# Patient Record
Sex: FEMALE | Race: WHITE | NOT HISPANIC OR LATINO | Employment: OTHER | URBAN - METROPOLITAN AREA
[De-identification: names, ages, dates, MRNs, and addresses within clinical notes are randomized per-mention and may not be internally consistent; named-entity substitution may affect disease eponyms.]

---

## 2023-01-23 PROBLEM — N18.32 CHRONIC KIDNEY DISEASE, STAGE 3B (MULTI): Status: ACTIVE | Noted: 2023-01-23

## 2023-01-23 PROBLEM — I63.9 STROKE (MULTI): Status: ACTIVE | Noted: 2023-01-23

## 2023-01-23 PROBLEM — H90.3 BILATERAL SENSORINEURAL HEARING LOSS: Status: ACTIVE | Noted: 2023-01-23

## 2023-01-23 PROBLEM — J30.9 ALLERGIC RHINITIS: Status: ACTIVE | Noted: 2023-01-23

## 2023-01-23 PROBLEM — H90.3 ASYMMETRICAL SENSORINEURAL HEARING LOSS: Status: ACTIVE | Noted: 2023-01-23

## 2023-01-23 PROBLEM — M54.50 LOW BACK PAIN: Status: ACTIVE | Noted: 2023-01-23

## 2023-01-23 PROBLEM — J44.9 COPD (CHRONIC OBSTRUCTIVE PULMONARY DISEASE) (MULTI): Status: ACTIVE | Noted: 2023-01-23

## 2023-01-23 PROBLEM — M81.0 OSTEOPOROSIS: Status: ACTIVE | Noted: 2023-01-23

## 2023-01-23 PROBLEM — E11.3299 DIABETES MELLITUS WITH BACKGROUND RETINOPATHY (MULTI): Status: ACTIVE | Noted: 2023-01-23

## 2023-01-23 PROBLEM — E87.5 HYPERKALEMIA: Status: ACTIVE | Noted: 2023-01-23

## 2023-01-23 PROBLEM — I35.9 AORTIC VALVE DISEASE: Status: ACTIVE | Noted: 2023-01-23

## 2023-01-23 PROBLEM — H54.7 VISUAL LOSS: Status: ACTIVE | Noted: 2023-01-23

## 2023-01-23 PROBLEM — E55.9 VITAMIN D DEFICIENCY: Status: ACTIVE | Noted: 2023-01-23

## 2023-01-23 PROBLEM — S49.92XA SHOULDER INJURY, LEFT, INITIAL ENCOUNTER: Status: ACTIVE | Noted: 2023-01-23

## 2023-01-23 PROBLEM — M25.539 WRIST PAIN: Status: ACTIVE | Noted: 2023-01-23

## 2023-01-23 PROBLEM — H91.90 HEARING LOSS: Status: ACTIVE | Noted: 2023-01-23

## 2023-01-23 PROBLEM — E86.0 DEHYDRATION: Status: ACTIVE | Noted: 2023-01-23

## 2023-01-23 PROBLEM — E05.90 HYPERTHYROIDISM: Status: ACTIVE | Noted: 2023-01-23

## 2023-01-23 PROBLEM — M25.511 RIGHT SHOULDER PAIN: Status: ACTIVE | Noted: 2023-01-23

## 2023-01-23 PROBLEM — R73.9 ACUTE HYPERGLYCEMIA: Status: ACTIVE | Noted: 2023-01-23

## 2023-01-23 PROBLEM — E11.319 DIABETIC RETINOPATHY (MULTI): Status: ACTIVE | Noted: 2023-01-23

## 2023-01-23 PROBLEM — K52.9 GASTROENTERITIS: Status: ACTIVE | Noted: 2023-01-23

## 2023-01-23 PROBLEM — H61.22 CERUMEN DEBRIS ON TYMPANIC MEMBRANE OF LEFT EAR: Status: ACTIVE | Noted: 2023-01-23

## 2023-01-23 PROBLEM — S39.92XA INJURY OF BACK: Status: ACTIVE | Noted: 2023-01-23

## 2023-01-23 PROBLEM — K21.9 GERD (GASTROESOPHAGEAL REFLUX DISEASE): Status: ACTIVE | Noted: 2023-01-23

## 2023-01-23 PROBLEM — M19.90 ARTHRITIS: Status: ACTIVE | Noted: 2023-01-23

## 2023-01-23 PROBLEM — I10 BENIGN ESSENTIAL HTN: Status: ACTIVE | Noted: 2023-01-23

## 2023-01-23 PROBLEM — K76.9 LIVER DISEASE, CHRONIC: Status: ACTIVE | Noted: 2023-01-23

## 2023-01-23 PROBLEM — K57.90 DIVERTICULOSIS: Status: ACTIVE | Noted: 2023-01-23

## 2023-01-23 RX ORDER — LISINOPRIL 10 MG/1
5 TABLET ORAL 2 TIMES DAILY
COMMUNITY
Start: 2021-11-19 | End: 2023-03-07 | Stop reason: ALTCHOICE

## 2023-01-23 RX ORDER — FLUTICASONE PROPIONATE 50 MCG
1 SPRAY, SUSPENSION (ML) NASAL
COMMUNITY
Start: 2021-05-05

## 2023-01-23 RX ORDER — INSULIN ASPART 100 [IU]/ML
INJECTION, SOLUTION INTRAVENOUS; SUBCUTANEOUS
COMMUNITY
Start: 2021-05-05

## 2023-01-23 RX ORDER — OMEPRAZOLE 20 MG/1
20 CAPSULE, DELAYED RELEASE ORAL
COMMUNITY
Start: 2021-05-05 | End: 2023-03-07 | Stop reason: SDUPTHER

## 2023-01-23 RX ORDER — INSULIN GLARGINE 100 [IU]/ML
22 INJECTION, SOLUTION SUBCUTANEOUS EVERY MORNING
COMMUNITY
Start: 2021-05-05 | End: 2023-03-07 | Stop reason: ALTCHOICE

## 2023-01-23 RX ORDER — CHOLECALCIFEROL (VITAMIN D3) 25 MCG
TABLET ORAL
COMMUNITY
Start: 2022-12-13

## 2023-01-23 RX ORDER — IBANDRONATE SODIUM 150 MG/1
150 TABLET, FILM COATED ORAL
COMMUNITY
Start: 2021-05-05 | End: 2023-03-07 | Stop reason: SDUPTHER

## 2023-01-23 RX ORDER — ATORVASTATIN CALCIUM 20 MG/1
20 TABLET, FILM COATED ORAL DAILY
COMMUNITY
Start: 2021-05-05 | End: 2023-03-07 | Stop reason: SDUPTHER

## 2023-02-28 RX ORDER — PEN NEEDLE, DIABETIC 30 GX3/16"
NEEDLE, DISPOSABLE MISCELLANEOUS DAILY
COMMUNITY

## 2023-03-03 LAB
ESTIMATED AVERAGE GLUCOSE FOR HBA1C: 189 MG/DL
HEMOGLOBIN A1C/HEMOGLOBIN TOTAL IN BLOOD: 8.2 %

## 2023-03-04 LAB
ALANINE AMINOTRANSFERASE (SGPT) (U/L) IN SER/PLAS: 14 U/L (ref 7–45)
ALBUMIN (G/DL) IN SER/PLAS: 4.3 G/DL (ref 3.4–5)
ALKALINE PHOSPHATASE (U/L) IN SER/PLAS: 64 U/L (ref 33–136)
ANION GAP IN SER/PLAS: 14 MMOL/L (ref 10–20)
ASPARTATE AMINOTRANSFERASE (SGOT) (U/L) IN SER/PLAS: 22 U/L (ref 9–39)
BILIRUBIN TOTAL (MG/DL) IN SER/PLAS: 0.5 MG/DL (ref 0–1.2)
CALCIUM (MG/DL) IN SER/PLAS: 9.6 MG/DL (ref 8.6–10.6)
CARBON DIOXIDE, TOTAL (MMOL/L) IN SER/PLAS: 26 MMOL/L (ref 21–32)
CHLORIDE (MMOL/L) IN SER/PLAS: 107 MMOL/L (ref 98–107)
CHOLESTEROL (MG/DL) IN SER/PLAS: 177 MG/DL (ref 0–199)
CHOLESTEROL IN HDL (MG/DL) IN SER/PLAS: 59.5 MG/DL
CHOLESTEROL/HDL RATIO: 3
CREATININE (MG/DL) IN SER/PLAS: 1.33 MG/DL (ref 0.5–1.05)
GFR FEMALE: 42 ML/MIN/1.73M2
GLUCOSE (MG/DL) IN SER/PLAS: 97 MG/DL (ref 74–99)
LDL: 102 MG/DL (ref 0–99)
POTASSIUM (MMOL/L) IN SER/PLAS: 4.6 MMOL/L (ref 3.5–5.3)
PROTEIN TOTAL: 7.4 G/DL (ref 6.4–8.2)
SODIUM (MMOL/L) IN SER/PLAS: 142 MMOL/L (ref 136–145)
THYROTROPIN (MIU/L) IN SER/PLAS BY DETECTION LIMIT <= 0.05 MIU/L: 2.42 MIU/L (ref 0.44–3.98)
THYROXINE (T4) FREE (NG/DL) IN SER/PLAS: 1.06 NG/DL (ref 0.78–1.48)
TRIGLYCERIDE (MG/DL) IN SER/PLAS: 76 MG/DL (ref 0–149)
TRIIODOTHYRONINE (T3) (NG/DL) IN SER/PLAS: 106 NG/DL (ref 60–200)
UREA NITROGEN (MG/DL) IN SER/PLAS: 27 MG/DL (ref 6–23)
VLDL: 15 MG/DL (ref 0–40)

## 2023-03-07 ENCOUNTER — OFFICE VISIT (OUTPATIENT)
Dept: PRIMARY CARE | Facility: CLINIC | Age: 73
End: 2023-03-07
Payer: MEDICARE

## 2023-03-07 VITALS
SYSTOLIC BLOOD PRESSURE: 100 MMHG | HEART RATE: 68 BPM | TEMPERATURE: 98.2 F | BODY MASS INDEX: 20.2 KG/M2 | DIASTOLIC BLOOD PRESSURE: 60 MMHG | WEIGHT: 107 LBS | RESPIRATION RATE: 16 BRPM | HEIGHT: 61 IN

## 2023-03-07 DIAGNOSIS — K21.00 GASTROESOPHAGEAL REFLUX DISEASE WITH ESOPHAGITIS WITHOUT HEMORRHAGE: ICD-10-CM

## 2023-03-07 DIAGNOSIS — I10 BENIGN ESSENTIAL HTN: ICD-10-CM

## 2023-03-07 DIAGNOSIS — I63.50 CEREBROVASCULAR ACCIDENT (CVA) DUE TO STENOSIS OF CEREBRAL ARTERY (MULTI): Primary | ICD-10-CM

## 2023-03-07 DIAGNOSIS — M81.0 AGE-RELATED OSTEOPOROSIS WITHOUT CURRENT PATHOLOGICAL FRACTURE: ICD-10-CM

## 2023-03-07 PROBLEM — M25.539 WRIST PAIN: Status: RESOLVED | Noted: 2023-01-23 | Resolved: 2023-03-07

## 2023-03-07 PROBLEM — H90.3 ASYMMETRICAL SENSORINEURAL HEARING LOSS: Status: RESOLVED | Noted: 2023-01-23 | Resolved: 2023-03-07

## 2023-03-07 PROBLEM — S39.92XA INJURY OF BACK: Status: RESOLVED | Noted: 2023-01-23 | Resolved: 2023-03-07

## 2023-03-07 PROBLEM — E86.0 DEHYDRATION: Status: RESOLVED | Noted: 2023-01-23 | Resolved: 2023-03-07

## 2023-03-07 PROBLEM — H61.22 CERUMEN DEBRIS ON TYMPANIC MEMBRANE OF LEFT EAR: Status: RESOLVED | Noted: 2023-01-23 | Resolved: 2023-03-07

## 2023-03-07 PROBLEM — M54.50 LOW BACK PAIN: Status: RESOLVED | Noted: 2023-01-23 | Resolved: 2023-03-07

## 2023-03-07 PROBLEM — R73.9 ACUTE HYPERGLYCEMIA: Status: RESOLVED | Noted: 2023-01-23 | Resolved: 2023-03-07

## 2023-03-07 PROBLEM — H91.90 HEARING LOSS: Status: RESOLVED | Noted: 2023-01-23 | Resolved: 2023-03-07

## 2023-03-07 PROBLEM — J30.9 ALLERGIC RHINITIS: Status: RESOLVED | Noted: 2023-01-23 | Resolved: 2023-03-07

## 2023-03-07 PROBLEM — K52.9 GASTROENTERITIS: Status: RESOLVED | Noted: 2023-01-23 | Resolved: 2023-03-07

## 2023-03-07 PROBLEM — M25.511 RIGHT SHOULDER PAIN: Status: RESOLVED | Noted: 2023-01-23 | Resolved: 2023-03-07

## 2023-03-07 PROBLEM — S49.92XA SHOULDER INJURY, LEFT, INITIAL ENCOUNTER: Status: RESOLVED | Noted: 2023-01-23 | Resolved: 2023-03-07

## 2023-03-07 PROBLEM — E87.5 HYPERKALEMIA: Status: RESOLVED | Noted: 2023-01-23 | Resolved: 2023-03-07

## 2023-03-07 PROCEDURE — 3052F HG A1C>EQUAL 8.0%<EQUAL 9.0%: CPT | Performed by: INTERNAL MEDICINE

## 2023-03-07 PROCEDURE — 1159F MED LIST DOCD IN RCRD: CPT | Performed by: INTERNAL MEDICINE

## 2023-03-07 PROCEDURE — 4010F ACE/ARB THERAPY RXD/TAKEN: CPT | Performed by: INTERNAL MEDICINE

## 2023-03-07 PROCEDURE — 3074F SYST BP LT 130 MM HG: CPT | Performed by: INTERNAL MEDICINE

## 2023-03-07 PROCEDURE — 99214 OFFICE O/P EST MOD 30 MIN: CPT | Performed by: INTERNAL MEDICINE

## 2023-03-07 PROCEDURE — 3078F DIAST BP <80 MM HG: CPT | Performed by: INTERNAL MEDICINE

## 2023-03-07 RX ORDER — INSULIN DEGLUDEC 100 U/ML
24 INJECTION, SOLUTION SUBCUTANEOUS EVERY MORNING
COMMUNITY
Start: 2023-02-24 | End: 2023-06-16 | Stop reason: DRUGHIGH

## 2023-03-07 RX ORDER — IBANDRONATE SODIUM 150 MG/1
150 TABLET, FILM COATED ORAL
Qty: 3 TABLET | Refills: 1 | Status: SHIPPED | OUTPATIENT
Start: 2023-03-07 | End: 2023-06-16 | Stop reason: SDUPTHER

## 2023-03-07 RX ORDER — OMEPRAZOLE 20 MG/1
20 CAPSULE, DELAYED RELEASE ORAL
Qty: 90 CAPSULE | Refills: 0 | Status: SHIPPED | OUTPATIENT
Start: 2023-03-07 | End: 2023-06-16 | Stop reason: SDUPTHER

## 2023-03-07 RX ORDER — ATORVASTATIN CALCIUM 20 MG/1
20 TABLET, FILM COATED ORAL DAILY
Qty: 90 TABLET | Refills: 1 | Status: SHIPPED | OUTPATIENT
Start: 2023-03-07 | End: 2023-03-28 | Stop reason: SDUPTHER

## 2023-03-07 RX ORDER — LISINOPRIL 5 MG/1
5 TABLET ORAL 2 TIMES DAILY
Qty: 180 TABLET | Refills: 0 | Status: SHIPPED | OUTPATIENT
Start: 2023-03-07 | End: 2023-06-16 | Stop reason: ALTCHOICE

## 2023-03-07 NOTE — PROGRESS NOTES
Recent cardio neuro and eye doc nephro due Subjective   Patient ID: Janay Hilliard is a 72 y.o. female who presents for Diabetes.  HPI  Patient is here for follow-up today.  She has history of stroke COPD hypertension diabetes chronic kidney disease osteoporosis.  She has been consistent with her medications.  She had a recent change of her long-acting insulin.  It was changed from Lantus to Tresiba.  Her dose is 24 units in the morning.  She had a low sugar this morning it was in the 40s.  She thought that it was from her Tresiba change.  She did take a full dose of her short acting insulin however with breakfast.  At that time she had an English muffin she did not have any protein with it.  Several hours later she had a low sugar.  She uses a freestyle sofya and it detects low sugars and gives her an alarm.  She was able to drink some sugar containing beverages and her sugar is better now.  It is about 160 in the office.  She was not having symptoms from the low sugar.  Her daughter was present who helps give some of the history and is a partial historian.  She does not think her mom eats right and does not get enough protein.  Janay is seeing her routine specialists including her neurologist her eye doctor and her cardiologist.  She also sees her endocrinologist they were the ones that adjusted her Tresiba.  She has a nephrology appointment coming up.  Her vision is poor chronically so it is unchanged.  No new neurological symptoms.  She denies chest pain shortness of breath headaches dizziness or lightheadedness.  She does not have any edema in her lower extremities.  Her GI system is unremarkable.    Review of Systems  Review of systems was performed and is otherwise negative except as noted in HPI.  Objective   Vitals:    03/07/23 1149   BP: 100/60   Pulse: 68   Resp: 16   Temp: 36.8 °C (98.2 °F)      Physical Exam  CONSTITUTIONAL - well nourished, well developed, looks like stated age, in no acute distress, not  ill-appearing, and not tired appearing  SKIN - normal skin color and pigmentation, normal skin turgor without rash, lesions, or nodules visualized  HEAD - no trauma, normocephalic  EYES - some strabismus  ENT - TM's intact, no injection, no exudate,  nasal passage without discharge and patent  NECK - supple without rigidity, no neck mass was observed, no thyromegaly or thyroid nodules  CHEST - clear to auscultation, no wheezing, no crackles and no rales, good effort  CARDIAC - regular rate and regular rhythm, no skipped beats, no murmur  ABDOMEN - no organomegaly, soft, nontender, nondistended, normal bowel sounds, no guarding/rebound/rigidity  EXTREMITIES - no edema, no deformities  NEUROLOGICAL -no gross neurological deficits some confusion  PSYCHIATRIC - alert, pleasant and cordial, age-appropriate  LYMPHATIC- no cervical lymphadenopathy    Assessment/Plan   Diagnoses and all orders for this visit:  Cerebrovascular accident (CVA) due to stenosis of cerebral artery (CMS/HCC)  -     apixaban (Eliquis) 5 mg tablet; Take 1 tablet (5 mg) by mouth in the morning and 1 tablet (5 mg) in the evening.  -     atorvastatin (Lipitor) 20 mg tablet; Take 1 tablet (20 mg) by mouth once daily.  Benign essential HTN  -     atorvastatin (Lipitor) 20 mg tablet; Take 1 tablet (20 mg) by mouth once daily.  -     lisinopril 5 mg tablet; Take 1 tablet (5 mg) by mouth in the morning and 1 tablet (5 mg) before bedtime.  Gastroesophageal reflux disease with esophagitis without hemorrhage  -     omeprazole (PriLOSEC) 20 mg DR capsule; Take 1 capsule (20 mg) by mouth once daily in the morning. Take before meals.  Age-related osteoporosis without current pathological fracture  -     ibandronate (Boniva) 150 mg tablet; Take 1 tablet (150 mg) by mouth every 30 (thirty) days.  Follow-up with me in 3 months  Get blood work before the appointment  Refills done as noted  Blood work reviewed in office  Patient had hemoglobin A1c lipid chemistry and  full thyroid panel  Discussed with patient the low sugars and the relationship likely to the short acting insulin and the need to include protein in her diet she is to notify her endocrinologist for low sugars  Taylor Nieto MD

## 2023-03-27 ENCOUNTER — TELEPHONE (OUTPATIENT)
Dept: PRIMARY CARE | Facility: CLINIC | Age: 73
End: 2023-03-27
Payer: COMMERCIAL

## 2023-03-27 DIAGNOSIS — I63.50 CEREBROVASCULAR ACCIDENT (CVA) DUE TO STENOSIS OF CEREBRAL ARTERY (MULTI): ICD-10-CM

## 2023-03-27 DIAGNOSIS — I10 BENIGN ESSENTIAL HTN: ICD-10-CM

## 2023-03-27 NOTE — TELEPHONE ENCOUNTER
Patient LDM on machine with a question about whether or not she should be taking eliquis and if so how should she be taking it.    CB#  [phone].    Or can speak with her daughter.   Lov 1.19.23  Uov 6.16.23

## 2023-03-27 NOTE — TELEPHONE ENCOUNTER
Patient LDM on machine for refill  Atorvastatin 20 mg , has a few days left  Lov 1.19.23  Uov  6.16.23  DM Marry Jones

## 2023-03-28 RX ORDER — ATORVASTATIN CALCIUM 20 MG/1
20 TABLET, FILM COATED ORAL DAILY
Qty: 90 TABLET | Refills: 0 | Status: SHIPPED | OUTPATIENT
Start: 2023-03-28 | End: 2023-06-16 | Stop reason: SDUPTHER

## 2023-03-28 NOTE — TELEPHONE ENCOUNTER
Patient remains tobacco free at this time. The patient remains on the prescribed tobacco cessation medication regimen of 1 mg Chantix BID without any negative side effects at this time. .The patient denies any abnormal behavioral or mental changes at this time. We discussed and reviewed: self confidence, triggers, strategies, habitual behavior, high risks situations (Holiday Season).  Introduced lapses, relapses, understanding them and analyzing the situation of a lapse, conflict issues that may be linked to a lapse. She is feeling confident that she will sustain her tobacco free status, she has support from family and friends now. She will follow up in 2 weeks.    Pt's daughter made aware pt needs to continue eliquis

## 2023-05-11 LAB
ALBUMIN (G/DL) IN SER/PLAS: 3.9 G/DL (ref 3.4–5)
ALBUMIN (MG/L) IN URINE: 209.1 MG/L
ALBUMIN/CREATININE (UG/MG) IN URINE: 153.8 UG/MG CRT (ref 0–30)
ANION GAP IN SER/PLAS: 13 MMOL/L (ref 10–20)
CALCIDIOL (25 OH VITAMIN D3) (NG/ML) IN SER/PLAS: 36 NG/ML
CALCIUM (MG/DL) IN SER/PLAS: 8.8 MG/DL (ref 8.6–10.6)
CARBON DIOXIDE, TOTAL (MMOL/L) IN SER/PLAS: 25 MMOL/L (ref 21–32)
CHLORIDE (MMOL/L) IN SER/PLAS: 103 MMOL/L (ref 98–107)
CREATININE (MG/DL) IN SER/PLAS: 1.68 MG/DL (ref 0.5–1.05)
CREATININE (MG/DL) IN URINE: 136 MG/DL (ref 20–320)
CREATININE (MG/DL) IN URINE: 136 MG/DL (ref 20–320)
GFR FEMALE: 32 ML/MIN/1.73M2
GLUCOSE (MG/DL) IN SER/PLAS: 275 MG/DL (ref 74–99)
PARATHYRIN INTACT (PG/ML) IN SER/PLAS: 162.4 PG/ML (ref 18.5–88)
PHOSPHATE (MG/DL) IN SER/PLAS: 5.3 MG/DL (ref 2.5–4.9)
POTASSIUM (MMOL/L) IN SER/PLAS: 4.5 MMOL/L (ref 3.5–5.3)
PROTEIN (MG/DL) IN URINE: 61 MG/DL (ref 5–24)
PROTEIN/CREATININE (MG/MG) IN URINE: 0.45 MG/MG CREAT (ref 0–0.17)
SODIUM (MMOL/L) IN SER/PLAS: 136 MMOL/L (ref 136–145)
UREA NITROGEN (MG/DL) IN SER/PLAS: 32 MG/DL (ref 6–23)

## 2023-06-16 ENCOUNTER — OFFICE VISIT (OUTPATIENT)
Dept: PRIMARY CARE | Facility: CLINIC | Age: 73
End: 2023-06-16
Payer: MEDICARE

## 2023-06-16 VITALS
DIASTOLIC BLOOD PRESSURE: 62 MMHG | WEIGHT: 107 LBS | TEMPERATURE: 97.9 F | RESPIRATION RATE: 16 BRPM | SYSTOLIC BLOOD PRESSURE: 100 MMHG | HEIGHT: 61 IN | HEART RATE: 76 BPM | BODY MASS INDEX: 20.2 KG/M2

## 2023-06-16 DIAGNOSIS — I10 BENIGN ESSENTIAL HTN: ICD-10-CM

## 2023-06-16 DIAGNOSIS — Z79.4 TYPE 2 DIABETES MELLITUS WITH STABLE PROLIFERATIVE RETINOPATHY OF BOTH EYES, WITH LONG-TERM CURRENT USE OF INSULIN (MULTI): ICD-10-CM

## 2023-06-16 DIAGNOSIS — E46 PROTEIN-CALORIE MALNUTRITION, UNSPECIFIED SEVERITY (MULTI): Primary | ICD-10-CM

## 2023-06-16 DIAGNOSIS — N25.81 SECONDARY HYPERPARATHYROIDISM OF RENAL ORIGIN (MULTI): ICD-10-CM

## 2023-06-16 DIAGNOSIS — I63.50 CEREBROVASCULAR ACCIDENT (CVA) DUE TO STENOSIS OF CEREBRAL ARTERY (MULTI): ICD-10-CM

## 2023-06-16 DIAGNOSIS — E11.3553 TYPE 2 DIABETES MELLITUS WITH STABLE PROLIFERATIVE RETINOPATHY OF BOTH EYES, WITH LONG-TERM CURRENT USE OF INSULIN (MULTI): ICD-10-CM

## 2023-06-16 DIAGNOSIS — M81.0 AGE-RELATED OSTEOPOROSIS WITHOUT CURRENT PATHOLOGICAL FRACTURE: ICD-10-CM

## 2023-06-16 DIAGNOSIS — K21.00 GASTROESOPHAGEAL REFLUX DISEASE WITH ESOPHAGITIS WITHOUT HEMORRHAGE: ICD-10-CM

## 2023-06-16 DIAGNOSIS — I73.9 PERIPHERAL VASCULAR DISEASE, UNSPECIFIED (CMS-HCC): ICD-10-CM

## 2023-06-16 PROCEDURE — 1160F RVW MEDS BY RX/DR IN RCRD: CPT | Performed by: INTERNAL MEDICINE

## 2023-06-16 PROCEDURE — 3078F DIAST BP <80 MM HG: CPT | Performed by: INTERNAL MEDICINE

## 2023-06-16 PROCEDURE — 3074F SYST BP LT 130 MM HG: CPT | Performed by: INTERNAL MEDICINE

## 2023-06-16 PROCEDURE — 4010F ACE/ARB THERAPY RXD/TAKEN: CPT | Performed by: INTERNAL MEDICINE

## 2023-06-16 PROCEDURE — 99214 OFFICE O/P EST MOD 30 MIN: CPT | Performed by: INTERNAL MEDICINE

## 2023-06-16 PROCEDURE — 1036F TOBACCO NON-USER: CPT | Performed by: INTERNAL MEDICINE

## 2023-06-16 PROCEDURE — 3052F HG A1C>EQUAL 8.0%<EQUAL 9.0%: CPT | Performed by: INTERNAL MEDICINE

## 2023-06-16 PROCEDURE — 1159F MED LIST DOCD IN RCRD: CPT | Performed by: INTERNAL MEDICINE

## 2023-06-16 RX ORDER — ATORVASTATIN CALCIUM 20 MG/1
20 TABLET, FILM COATED ORAL DAILY
Qty: 90 TABLET | Refills: 1 | Status: SHIPPED | OUTPATIENT
Start: 2023-06-16 | End: 2024-02-14 | Stop reason: SDUPTHER

## 2023-06-16 RX ORDER — LISINOPRIL 5 MG/1
5 TABLET ORAL 2 TIMES DAILY
Qty: 180 TABLET | Refills: 1 | Status: SHIPPED | OUTPATIENT
Start: 2023-06-16 | End: 2024-04-12 | Stop reason: SDUPTHER

## 2023-06-16 RX ORDER — INSULIN DEGLUDEC 100 U/ML
18 INJECTION, SOLUTION SUBCUTANEOUS EVERY MORNING
Qty: 3 ML | Refills: 0 | Status: SHIPPED | OUTPATIENT
Start: 2023-06-16

## 2023-06-16 RX ORDER — OMEPRAZOLE 20 MG/1
20 CAPSULE, DELAYED RELEASE ORAL
Qty: 90 CAPSULE | Refills: 0 | Status: SHIPPED | OUTPATIENT
Start: 2023-06-16 | End: 2024-02-14 | Stop reason: SDUPTHER

## 2023-06-16 RX ORDER — LISINOPRIL 5 MG/1
TABLET ORAL EVERY 12 HOURS
COMMUNITY
Start: 2021-10-21 | End: 2023-06-16 | Stop reason: SDUPTHER

## 2023-06-16 RX ORDER — LISINOPRIL 10 MG/1
0.5 TABLET ORAL 2 TIMES DAILY
COMMUNITY
Start: 2021-11-19 | End: 2023-06-16 | Stop reason: ALTCHOICE

## 2023-06-16 RX ORDER — IBANDRONATE SODIUM 150 MG/1
150 TABLET, FILM COATED ORAL
Qty: 3 TABLET | Refills: 1 | Status: SHIPPED | OUTPATIENT
Start: 2023-06-16 | End: 2023-09-22 | Stop reason: ALTCHOICE

## 2023-06-16 RX ORDER — LISINOPRIL 5 MG/1
5 TABLET ORAL 2 TIMES DAILY
Qty: 180 TABLET | Refills: 0 | Status: CANCELLED | OUTPATIENT
Start: 2023-06-16 | End: 2023-09-14

## 2023-06-16 ASSESSMENT — COLUMBIA-SUICIDE SEVERITY RATING SCALE - C-SSRS
6. HAVE YOU EVER DONE ANYTHING, STARTED TO DO ANYTHING, OR PREPARED TO DO ANYTHING TO END YOUR LIFE?: NO
1. IN THE PAST MONTH, HAVE YOU WISHED YOU WERE DEAD OR WISHED YOU COULD GO TO SLEEP AND NOT WAKE UP?: NO
2. HAVE YOU ACTUALLY HAD ANY THOUGHTS OF KILLING YOURSELF?: NO

## 2023-06-16 ASSESSMENT — PATIENT HEALTH QUESTIONNAIRE - PHQ9
1. LITTLE INTEREST OR PLEASURE IN DOING THINGS: NOT AT ALL
2. FEELING DOWN, DEPRESSED OR HOPELESS: NOT AT ALL
SUM OF ALL RESPONSES TO PHQ9 QUESTIONS 1 AND 2: 0

## 2023-06-16 ASSESSMENT — ENCOUNTER SYMPTOMS: DEPRESSION: 0

## 2023-06-16 NOTE — PROGRESS NOTES
"Subjective   Patient ID: Janay Hilliard is a 73 y.o. female who presents for Med Refill, Hypertension, Hyperlipidemia, and Diabetes.  HPI  Patient presents today with her daughter who is her historian as well she is following up on her diabetes hyperlipidemia hypertension renal insufficiency acid reflux and thin bones.    She is due for bone density they found her last one  it was due to done in 2021.  So she is due now.  She has osteoporosis and she is on Boniva after she thought she had been on it since 2020 but it turns out it was maybe 2018.  So we talked that she will need to see a rheumatologist if she can be off this medication for other options patient denies a history of fractures    Patient continues to follow-up with her cardiologist Dr. Pretty    She sees her Endo at the [facility]  She sees nephrology as well her next appoint with them is not for a few months      Review of Systems  Review of systems was performed and is otherwise negative except as noted in HPI.  Objective   /62   Pulse 76   Temp 36.6 °C (97.9 °F)   Resp 16   Ht 1.549 m (5' 1\")   Wt 48.5 kg (107 lb)   BMI 20.22 kg/m²    Physical Exam  HEENT is normal  Lungs clear bilaterally  Heart reveals a 3 out of 6 systolic ejection murmur that is heard diffusely  Abdomen is benign  Lower extremities no edema  Assessment/Plan   Diagnoses and all orders for this visit:  Protein-calorie malnutrition, unspecified severity (CMS/HCC)  Cerebrovascular accident (CVA) due to stenosis of cerebral artery (CMS/HCC)  -     apixaban (Eliquis) 5 mg tablet; Take 1 tablet (5 mg) by mouth every 12 hours.  -     atorvastatin (Lipitor) 20 mg tablet; Take 1 tablet (20 mg) by mouth once daily.  Benign essential HTN  -     atorvastatin (Lipitor) 20 mg tablet; Take 1 tablet (20 mg) by mouth once daily.  -     lisinopril 5 mg tablet; Take 1 tablet (5 mg) by mouth 2 times a day.  -     Hemoglobin A1C; Future  -     Lipid Panel; Future  -     Comprehensive " Metabolic Panel; Future  Gastroesophageal reflux disease with esophagitis without hemorrhage  -     omeprazole (PriLOSEC) 20 mg DR capsule; Take 1 capsule (20 mg) by mouth once daily in the morning. Take before meals.  Age-related osteoporosis without current pathological fracture  -     ibandronate (Boniva) 150 mg tablet; Take 1 tablet (150 mg) by mouth every 30 (thirty) days.  -     XR DEXA bone density; Future  Peripheral vascular disease, unspecified (CMS/HCC)  Type 2 diabetes mellitus with stable proliferative retinopathy of both eyes, with long-term current use of insulin (CMS/[facility])  -     Hemoglobin A1C; Future  -     Lipid Panel; Future  -     Comprehensive Metabolic Panel; Future  Secondary hyperparathyroidism of renal origin (CMS/[facility])  -     Hemoglobin A1C; Future  -     Lipid Panel; Future  -     Comprehensive Metabolic Panel; Future  Other orders  -     Tresiba FlexTouch U-100 100 unit/mL (3 mL) injection; Inject 18 Units under the skin once daily in the morning.    Updated her orders in her chart to reflect current dosing  Ordered her DEXA   Blood work ordered for next visit  She is to keep up with her eye doctor appointments  We will check the DEXA and if its not amenable to discontinuing the bisphosphonate will refer to rheumatology  Continue routine follow-up with cardiology  Patient has blood work orders for upcoming appointment from an endocrinology    Taylor Nieto MD    Eucrisa Counseling: Patient may experience a mild burning sensation during topical application. Eucrisa is not approved in children less than 2 years of age.

## 2023-06-30 LAB
ALANINE AMINOTRANSFERASE (SGPT) (U/L) IN SER/PLAS: 11 U/L (ref 7–45)
ALBUMIN (G/DL) IN SER/PLAS: 4.1 G/DL (ref 3.4–5)
ALBUMIN (MG/L) IN URINE: 144 MG/L
ALBUMIN/CREATININE (UG/MG) IN URINE: 149.4 UG/MG CRT (ref 0–30)
ALKALINE PHOSPHATASE (U/L) IN SER/PLAS: 66 U/L (ref 33–136)
ANION GAP IN SER/PLAS: 12 MMOL/L (ref 10–20)
ASPARTATE AMINOTRANSFERASE (SGOT) (U/L) IN SER/PLAS: 22 U/L (ref 9–39)
BILIRUBIN TOTAL (MG/DL) IN SER/PLAS: 0.6 MG/DL (ref 0–1.2)
CALCIUM (MG/DL) IN SER/PLAS: 9.5 MG/DL (ref 8.6–10.6)
CARBON DIOXIDE, TOTAL (MMOL/L) IN SER/PLAS: 23 MMOL/L (ref 21–32)
CHLORIDE (MMOL/L) IN SER/PLAS: 110 MMOL/L (ref 98–107)
CHOLESTEROL (MG/DL) IN SER/PLAS: 165 MG/DL (ref 0–199)
CHOLESTEROL IN HDL (MG/DL) IN SER/PLAS: 48.5 MG/DL
CHOLESTEROL/HDL RATIO: 3.4
CREATININE (MG/DL) IN SER/PLAS: 1.49 MG/DL (ref 0.5–1.05)
CREATININE (MG/DL) IN URINE: 96.4 MG/DL (ref 20–320)
ESTIMATED AVERAGE GLUCOSE FOR HBA1C: 183 MG/DL
GFR FEMALE: 37 ML/MIN/1.73M2
GLUCOSE (MG/DL) IN SER/PLAS: 155 MG/DL (ref 74–99)
HEMOGLOBIN A1C/HEMOGLOBIN TOTAL IN BLOOD: 8 %
NON-HDL CHOLESTEROL: 117 MG/DL
POTASSIUM (MMOL/L) IN SER/PLAS: 4.3 MMOL/L (ref 3.5–5.3)
PROTEIN TOTAL: 7.3 G/DL (ref 6.4–8.2)
SODIUM (MMOL/L) IN SER/PLAS: 141 MMOL/L (ref 136–145)
UREA NITROGEN (MG/DL) IN SER/PLAS: 30 MG/DL (ref 6–23)

## 2023-07-03 DIAGNOSIS — M81.0 AGE RELATED OSTEOPOROSIS, UNSPECIFIED PATHOLOGICAL FRACTURE PRESENCE: Primary | ICD-10-CM

## 2023-09-21 ENCOUNTER — LAB (OUTPATIENT)
Dept: LAB | Facility: LAB | Age: 73
End: 2023-09-21
Payer: MEDICARE

## 2023-09-21 DIAGNOSIS — E11.3553 TYPE 2 DIABETES MELLITUS WITH STABLE PROLIFERATIVE RETINOPATHY OF BOTH EYES, WITH LONG-TERM CURRENT USE OF INSULIN (MULTI): ICD-10-CM

## 2023-09-21 DIAGNOSIS — I10 BENIGN ESSENTIAL HTN: ICD-10-CM

## 2023-09-21 DIAGNOSIS — N25.81 SECONDARY HYPERPARATHYROIDISM OF RENAL ORIGIN (MULTI): ICD-10-CM

## 2023-09-21 DIAGNOSIS — Z79.4 TYPE 2 DIABETES MELLITUS WITH STABLE PROLIFERATIVE RETINOPATHY OF BOTH EYES, WITH LONG-TERM CURRENT USE OF INSULIN (MULTI): ICD-10-CM

## 2023-09-21 LAB
ALANINE AMINOTRANSFERASE (SGPT) (U/L) IN SER/PLAS: 14 U/L (ref 7–45)
ALBUMIN (G/DL) IN SER/PLAS: 4.2 G/DL (ref 3.4–5)
ALKALINE PHOSPHATASE (U/L) IN SER/PLAS: 60 U/L (ref 33–136)
ANION GAP IN SER/PLAS: 14 MMOL/L (ref 10–20)
ASPARTATE AMINOTRANSFERASE (SGOT) (U/L) IN SER/PLAS: 24 U/L (ref 9–39)
BILIRUBIN TOTAL (MG/DL) IN SER/PLAS: 0.5 MG/DL (ref 0–1.2)
CALCIUM (MG/DL) IN SER/PLAS: 9.7 MG/DL (ref 8.6–10.6)
CARBON DIOXIDE, TOTAL (MMOL/L) IN SER/PLAS: 26 MMOL/L (ref 21–32)
CHLORIDE (MMOL/L) IN SER/PLAS: 107 MMOL/L (ref 98–107)
CHOLESTEROL (MG/DL) IN SER/PLAS: 152 MG/DL (ref 0–199)
CHOLESTEROL IN HDL (MG/DL) IN SER/PLAS: 50.6 MG/DL
CHOLESTEROL/HDL RATIO: 3
CREATININE (MG/DL) IN SER/PLAS: 1.45 MG/DL (ref 0.5–1.05)
ESTIMATED AVERAGE GLUCOSE FOR HBA1C: 177 MG/DL
GFR FEMALE: 38 ML/MIN/1.73M2
GLUCOSE (MG/DL) IN SER/PLAS: 115 MG/DL (ref 74–99)
HEMOGLOBIN A1C/HEMOGLOBIN TOTAL IN BLOOD: 7.8 %
LDL: 81 MG/DL (ref 0–99)
POTASSIUM (MMOL/L) IN SER/PLAS: 4.6 MMOL/L (ref 3.5–5.3)
PROTEIN TOTAL: 7.3 G/DL (ref 6.4–8.2)
SODIUM (MMOL/L) IN SER/PLAS: 142 MMOL/L (ref 136–145)
TRIGLYCERIDE (MG/DL) IN SER/PLAS: 100 MG/DL (ref 0–149)
UREA NITROGEN (MG/DL) IN SER/PLAS: 27 MG/DL (ref 6–23)
VLDL: 20 MG/DL (ref 0–40)

## 2023-09-21 PROCEDURE — 80053 COMPREHEN METABOLIC PANEL: CPT

## 2023-09-21 PROCEDURE — 80061 LIPID PANEL: CPT

## 2023-09-21 PROCEDURE — 36415 COLL VENOUS BLD VENIPUNCTURE: CPT

## 2023-09-21 PROCEDURE — 83036 HEMOGLOBIN GLYCOSYLATED A1C: CPT

## 2023-09-22 ENCOUNTER — OFFICE VISIT (OUTPATIENT)
Dept: PRIMARY CARE | Facility: CLINIC | Age: 73
End: 2023-09-22
Payer: MEDICARE

## 2023-09-22 VITALS
BODY MASS INDEX: 20.39 KG/M2 | HEART RATE: 79 BPM | DIASTOLIC BLOOD PRESSURE: 74 MMHG | WEIGHT: 108 LBS | SYSTOLIC BLOOD PRESSURE: 128 MMHG | HEIGHT: 61 IN | OXYGEN SATURATION: 98 %

## 2023-09-22 DIAGNOSIS — M81.0 AGE-RELATED OSTEOPOROSIS WITHOUT CURRENT PATHOLOGICAL FRACTURE: ICD-10-CM

## 2023-09-22 DIAGNOSIS — I10 BENIGN ESSENTIAL HTN: Primary | ICD-10-CM

## 2023-09-22 DIAGNOSIS — Z79.4 TYPE 2 DIABETES MELLITUS WITH STABLE PROLIFERATIVE RETINOPATHY OF BOTH EYES, WITH LONG-TERM CURRENT USE OF INSULIN (MULTI): ICD-10-CM

## 2023-09-22 DIAGNOSIS — E11.3553 TYPE 2 DIABETES MELLITUS WITH STABLE PROLIFERATIVE RETINOPATHY OF BOTH EYES, WITH LONG-TERM CURRENT USE OF INSULIN (MULTI): ICD-10-CM

## 2023-09-22 DIAGNOSIS — N18.32 CHRONIC KIDNEY DISEASE, STAGE 3B (MULTI): ICD-10-CM

## 2023-09-22 PROCEDURE — 1126F AMNT PAIN NOTED NONE PRSNT: CPT | Performed by: INTERNAL MEDICINE

## 2023-09-22 PROCEDURE — 3078F DIAST BP <80 MM HG: CPT | Performed by: INTERNAL MEDICINE

## 2023-09-22 PROCEDURE — 90662 IIV NO PRSV INCREASED AG IM: CPT | Performed by: INTERNAL MEDICINE

## 2023-09-22 PROCEDURE — 3051F HG A1C>EQUAL 7.0%<8.0%: CPT | Performed by: INTERNAL MEDICINE

## 2023-09-22 PROCEDURE — 99214 OFFICE O/P EST MOD 30 MIN: CPT | Performed by: INTERNAL MEDICINE

## 2023-09-22 PROCEDURE — 1160F RVW MEDS BY RX/DR IN RCRD: CPT | Performed by: INTERNAL MEDICINE

## 2023-09-22 PROCEDURE — 3074F SYST BP LT 130 MM HG: CPT | Performed by: INTERNAL MEDICINE

## 2023-09-22 PROCEDURE — 4010F ACE/ARB THERAPY RXD/TAKEN: CPT | Performed by: INTERNAL MEDICINE

## 2023-09-22 PROCEDURE — 1036F TOBACCO NON-USER: CPT | Performed by: INTERNAL MEDICINE

## 2023-09-22 PROCEDURE — G0008 ADMIN INFLUENZA VIRUS VAC: HCPCS | Performed by: INTERNAL MEDICINE

## 2023-09-22 PROCEDURE — 1159F MED LIST DOCD IN RCRD: CPT | Performed by: INTERNAL MEDICINE

## 2023-09-22 RX ORDER — IBANDRONATE SODIUM 150 MG/1
150 TABLET, FILM COATED ORAL
Qty: 3 TABLET | Refills: 1 | Status: CANCELLED | OUTPATIENT
Start: 2023-09-22

## 2023-09-22 ASSESSMENT — PAIN SCALES - GENERAL: PAINLEVEL: 0-NO PAIN

## 2023-09-22 NOTE — PROGRESS NOTES
"Subjective   Patient ID: Janay Hilliard is a 73 y.o. female who presents for Follow-up.  HPI  Patient presents today for follow-up of hypertension hyperlipidemia clotting disorder diabetes.    She sees endocrinology and has an upcoming appointment.  She also sees renal for chronic renal insufficiency.  She has a history of vision abnormalities and vision loss secondary to her diabetes.    Patient has not seen a cardiologist due to her heart murmur.  She presents today with her daughter who is her historian.  She has been feeling quite well however she had a recent DEXA and it was not showing good results.  Working to go ahead and stop her Boniva because she is at 5+ years and she has appoint with rheumatology upcoming to discuss options for that.  She has had some falls and has not had any broken bones that is encouraging.    Patient denies chest pains headaches dizziness lightheadedness or shortness of breath she does not have any lower extremity edema she has not been ill recently    Review of Systems  Review of systems was performed and is otherwise negative except as noted in HPI.  Objective   /74   Pulse 79   Ht 1.549 m (5' 1\")   Wt 49 kg (108 lb)   SpO2 98%   BMI 20.41 kg/m²    Physical Exam  HEENT is normal she has some abnormal alignment of her eyes drooping of her eyelids  Lungs clear bilaterally  Heart is regular rate and rhythm no murmurs  Abdomen is benign  She has an abnormal slightly unsteady gait    Assessment/Plan   Diagnoses and all orders for this visit:  Benign essential HTN  -     Follow Up In [facility] - PCP - Established; Future  Age-related osteoporosis without current pathological fracture  Type 2 diabetes mellitus with stable proliferative retinopathy of both eyes, with long-term current use of insulin (CMS/[facility])  -     Follow Up In [facility] - PCP - Established; Future  Chronic kidney disease, stage 3b (CMS/[facility])  -     Follow Up In [facility] - PCP " - Established; Future  Other orders  -     Flu vaccine, quadrivalent, high-dose, preservative free, age 65y+ (FLUZONE)    Call with issues  Follow-up with me in 3 months  Blood work is ordered  She will follow-up with me as scheduled as well as Endo cardiology and nephrology nephrology appointment as soon  Taylor Nieto MD

## 2023-10-06 ENCOUNTER — CLINICAL SUPPORT (OUTPATIENT)
Dept: AUDIOLOGY | Facility: CLINIC | Age: 73
End: 2023-10-06

## 2023-10-06 ENCOUNTER — TELEPHONE (OUTPATIENT)
Dept: PRIMARY CARE | Facility: CLINIC | Age: 73
End: 2023-10-06

## 2023-10-06 DIAGNOSIS — H90.3 SENSORINEURAL HEARING LOSS (SNHL) OF BOTH EARS: Primary | ICD-10-CM

## 2023-10-06 DIAGNOSIS — I73.9 PVD (PERIPHERAL VASCULAR DISEASE) (CMS-HCC): Primary | ICD-10-CM

## 2023-10-06 NOTE — TELEPHONE ENCOUNTER
Pt daughter called requesting test results on a test that she had done 2 weeks ago. She stated they have not heard. Did not specify what test was ordered. Please advise?

## 2023-10-06 NOTE — PROGRESS NOTES
[facility]  HEARING AID CHECK      Name:  Janay Hilliard  YOB: 1950  Today's date:  10/06/23      PATIENT ISSUES/CONCERNS AND OTOSCOPIC RESULTS   Otoscopic was clear.   Patient reports that neither hearing aid appears to be working.    HEARING AID INSPECTION AND ADJUSTMENT  Inspection reveals a plugged right filter and a missing left filter with plugged  tubing.  Cleaned both aids, suctioned receivers, replaced filters and cleaned vents.  Resulting listening check was good.  Analyzer data good.    Patient reports good subjective audibility following cleaning.  No need for adjustment today.    FOLLOW UP   The patient was asked to contact the office if they notice any significant change in hearing level or hearing aid function.    Otherwise, will follow up again in January as previously scheduled.    APPOINTMENT TIME  1:30-2:00pm    Reina Perera  Doctor of Audiology  Licensed Audiologist

## 2023-11-15 DIAGNOSIS — Z12.31 VISIT FOR SCREENING MAMMOGRAM: ICD-10-CM

## 2023-12-01 ENCOUNTER — APPOINTMENT (OUTPATIENT)
Dept: RHEUMATOLOGY | Facility: CLINIC | Age: 73
End: 2023-12-01
Payer: MEDICARE

## 2023-12-12 ENCOUNTER — APPOINTMENT (OUTPATIENT)
Dept: PRIMARY CARE | Facility: CLINIC | Age: 73
End: 2023-12-12
Payer: MEDICARE

## 2023-12-12 ENCOUNTER — OFFICE VISIT (OUTPATIENT)
Dept: PODIATRY | Facility: CLINIC | Age: 73
End: 2023-12-12
Payer: MEDICARE

## 2023-12-12 ENCOUNTER — TELEPHONE (OUTPATIENT)
Dept: PRIMARY CARE | Facility: CLINIC | Age: 73
End: 2023-12-12

## 2023-12-12 ENCOUNTER — CLINICAL SUPPORT (OUTPATIENT)
Dept: PRIMARY CARE | Facility: CLINIC | Age: 73
End: 2023-12-12
Payer: MEDICARE

## 2023-12-12 VITALS — SYSTOLIC BLOOD PRESSURE: 105 MMHG | TEMPERATURE: 97.8 F | HEART RATE: 86 BPM | DIASTOLIC BLOOD PRESSURE: 49 MMHG

## 2023-12-12 DIAGNOSIS — E11.42 DIABETIC POLYNEUROPATHY ASSOCIATED WITH TYPE 2 DIABETES MELLITUS (MULTI): ICD-10-CM

## 2023-12-12 DIAGNOSIS — M79.675 TOE PAIN, LEFT: ICD-10-CM

## 2023-12-12 DIAGNOSIS — I95.9 HYPOTENSION, UNSPECIFIED HYPOTENSION TYPE: ICD-10-CM

## 2023-12-12 DIAGNOSIS — M79.674 TOE PAIN, RIGHT: ICD-10-CM

## 2023-12-12 DIAGNOSIS — B35.1 TINEA UNGUIUM: Primary | ICD-10-CM

## 2023-12-12 PROCEDURE — 1160F RVW MEDS BY RX/DR IN RCRD: CPT | Performed by: PODIATRIST

## 2023-12-12 PROCEDURE — 4010F ACE/ARB THERAPY RXD/TAKEN: CPT | Performed by: PODIATRIST

## 2023-12-12 PROCEDURE — 1159F MED LIST DOCD IN RCRD: CPT | Performed by: PODIATRIST

## 2023-12-12 PROCEDURE — 3074F SYST BP LT 130 MM HG: CPT | Performed by: PODIATRIST

## 2023-12-12 PROCEDURE — 3078F DIAST BP <80 MM HG: CPT | Performed by: PODIATRIST

## 2023-12-12 PROCEDURE — 3051F HG A1C>EQUAL 7.0%<8.0%: CPT | Performed by: PODIATRIST

## 2023-12-12 PROCEDURE — 1126F AMNT PAIN NOTED NONE PRSNT: CPT | Performed by: PODIATRIST

## 2023-12-12 PROCEDURE — 11721 DEBRIDE NAIL 6 OR MORE: CPT | Performed by: PODIATRIST

## 2023-12-12 PROCEDURE — 1036F TOBACCO NON-USER: CPT | Performed by: PODIATRIST

## 2023-12-12 NOTE — PROGRESS NOTES
"CC:  painful thickened and elongated toenails and diabetic care.     HPI: Pt presents for dm foot exam and painful thickened and elongated toenails that are difficult to manage.  Onset was gradual with worsening course until recently.  Aggravated by shoe gear and ambulation.       PCP: Dr. Nieto  Last visit: 6-16-23     PMH  Past Medical History:   Diagnosis Date    Personal history of other diseases of the nervous system and sense organs 05/05/2021    History of hearing loss    Type 2 diabetes mellitus with diabetic chronic kidney disease (CMS/HCC) 03/26/2022    CKD stage 3 secondary to diabetes    Type 2 diabetes mellitus with diabetic chronic kidney disease (CMS/[facility]) 04/13/2022    CKD stage 3 secondary to diabetes     MEDS    Current Outpatient Medications:     apixaban (Eliquis) 5 mg tablet, Take 1 tablet (5 mg) by mouth every 12 hours., Disp: 180 tablet, Rfl: 1    atorvastatin (Lipitor) 20 mg tablet, Take 1 tablet (20 mg) by mouth once daily., Disp: 90 tablet, Rfl: 1    cholecalciferol (Vitamin D-3) 25 MCG (1000 UT) tablet, Take by mouth., Disp: , Rfl:     fluticasone (Flonase) 50 mcg/actuation nasal spray, Administer 1 spray into affected nostril(s)., Disp: , Rfl:     insulin aspart (NovoLOG) 100 unit/mL (3 mL) pen, Inject under the skin 3 times a day with meals. 9 units 5 units 9 units w meals, Disp: , Rfl:     lisinopril 5 mg tablet, Take 1 tablet (5 mg) by mouth 2 times a day. (Patient not taking: Reported on 12/12/2023), Disp: 180 tablet, Rfl: 1    omeprazole (PriLOSEC) 20 mg DR capsule, Take 1 capsule (20 mg) by mouth once daily in the morning. Take before meals., Disp: 90 capsule, Rfl: 0    pen needle, diabetic 32 gauge x 5/32\" needle, once daily. Use four pen needles every day, Disp: , Rfl:     Tresiba FlexTouch U-100 100 unit/mL (3 mL) injection, Inject 18 Units under the skin once daily in the morning., Disp: 3 mL, Rfl: 0  Allergies  Allergies   Allergen Reactions    Sulfa (Sulfonamide Antibiotics) " Unknown     Social History     Socioeconomic History    Marital status:      Spouse name: None    Number of children: None    Years of education: None    Highest education level: None   Occupational History    None   Tobacco Use    Smoking status: Never    Smokeless tobacco: Never   Vaping Use    Vaping Use: Never used   Substance and Sexual Activity    Alcohol use: Never    Drug use: Never    Sexual activity: Defer   Other Topics Concern    None   Social History Narrative    None     Social Determinants of Health     Financial Resource Strain: Not on file   Food Insecurity: Not on file   Transportation Needs: Not on file   Physical Activity: Not on file   Stress: Not on file   Social Connections: Not on file   Intimate Partner Violence: Not on file   Housing Stability: Not on file     Family History   Problem Relation Name Age of Onset    Diabetes Father      Diabetes Paternal Grandmother       Past Surgical History:   Procedure Laterality Date    OTHER SURGICAL HISTORY  2021    Tonsillectomy    OTHER SURGICAL HISTORY  2021     section    OTHER SURGICAL HISTORY  2021    Appendectomy       REVIEW OF SYSTEMS  DERM:   + as noted in HPI.       Physical examination:   On General Observation: Patient is a pleasant, cooperative, well developed 73 y.o. diabetic   adult female. The patient is alert and oriented to time, place and person. Patient has normal affect and mood.  BP (!) 105/49   Pulse 86   Temp 36.6 °C (97.8 °F)     Vascular:  DP and PT pulses are 1-2/4 b/l.  mild edema noted. mild varicosities b/l.  CFT  6 seconds to all digits bilateral.  Skin temperature is warm to warm from proximal to distal bilateral.      Muscular: Strength is 5/5 for all instrinsic and extrinsic muscle groups.     Neuro:  Proprioception decreased.   Sensation to vibration is  decreased. Protective sensation decreased  at all pedal sites via [place] 5.07 monofilament bilateral.  Light touch  decreased bilateral.     Derm:    Left toenails: 1-5 Brittleness, crumbling upon debridement, subungual debris, elongation, mycotic appearance, tenderness, and thickness.   Right toenails: 1-5 Brittleness, crumbling upon debridement, subungual debris, elongation, mycotic appearance, tenderness, and thickness.   Hair growth is decreased b/l le    ASSESSMENT:    Tinea Unguium [B35.1]   E11.42  Pain in right toe(s) [M79.674]   Pain in left toe(s) [M79.675]       PLAN:   Dm foot xam    - Debrided toenails 1-10 in length and height.   - Follow up in 9-12 weeks.       Haim Best DPM

## 2023-12-12 NOTE — TELEPHONE ENCOUNTER
Spoke w/Gregoria, patient was admitted in [place] & they wanted a follow up BMP lab test done. Patient appt is 12/19/23.

## 2023-12-12 NOTE — PROGRESS NOTES
Pt here today to see podiatrist. Also a pt of Dr. Nieto.  Pt was treated for pneumonia when out of town in [place].  BP running low and was told to hold BP medication.  BP today per podiatry nurse was 89/45 pulse 86  and 105/49 using wrist BP cuff.  Checked BP RUE per stethoscope 100/60, pulse 60. Pt sitting on exam table and asked to be helped down to sit in chair.  States her medications make her lightheaded.  Helped to chair.  Was seen by podiatrist.  Advised pt to check BP's at home.  Spoke to daughter and advised to have pt check BP's and to let office know if she is getting BP's above 150/ 90.  Pt has upcoming appt on  12/19/23 for follow up [facility].  Will send message to Dr. Nieto for review.  BALJINDER Wall LPN

## 2023-12-13 VITALS — SYSTOLIC BLOOD PRESSURE: 100 MMHG | HEART RATE: 60 BPM | DIASTOLIC BLOOD PRESSURE: 60 MMHG

## 2023-12-19 ENCOUNTER — OFFICE VISIT (OUTPATIENT)
Dept: PRIMARY CARE | Facility: CLINIC | Age: 73
End: 2023-12-19
Payer: MEDICARE

## 2023-12-19 ENCOUNTER — LAB (OUTPATIENT)
Dept: LAB | Facility: LAB | Age: 73
End: 2023-12-19
Payer: MEDICARE

## 2023-12-19 VITALS
TEMPERATURE: 98.2 F | HEIGHT: 61 IN | OXYGEN SATURATION: 99 % | WEIGHT: 104 LBS | BODY MASS INDEX: 19.63 KG/M2 | DIASTOLIC BLOOD PRESSURE: 70 MMHG | SYSTOLIC BLOOD PRESSURE: 102 MMHG | HEART RATE: 90 BPM

## 2023-12-19 DIAGNOSIS — E11.3553 TYPE 2 DIABETES MELLITUS WITH STABLE PROLIFERATIVE RETINOPATHY OF BOTH EYES, WITH LONG-TERM CURRENT USE OF INSULIN (MULTI): ICD-10-CM

## 2023-12-19 DIAGNOSIS — R30.0 DYSURIA: ICD-10-CM

## 2023-12-19 DIAGNOSIS — I10 BENIGN ESSENTIAL HTN: ICD-10-CM

## 2023-12-19 DIAGNOSIS — J18.9 PNEUMONIA OF RIGHT UPPER LOBE DUE TO INFECTIOUS ORGANISM: Primary | ICD-10-CM

## 2023-12-19 DIAGNOSIS — N18.32 CHRONIC KIDNEY DISEASE, STAGE 3B (MULTI): ICD-10-CM

## 2023-12-19 DIAGNOSIS — Z79.4 TYPE 2 DIABETES MELLITUS WITH STABLE PROLIFERATIVE RETINOPATHY OF BOTH EYES, WITH LONG-TERM CURRENT USE OF INSULIN (MULTI): ICD-10-CM

## 2023-12-19 DIAGNOSIS — R79.9 ABNORMAL BLOOD CHEMISTRY: ICD-10-CM

## 2023-12-19 LAB
ALBUMIN SERPL BCP-MCNC: 4.3 G/DL (ref 3.4–5)
ALP SERPL-CCNC: 67 U/L (ref 33–136)
ALT SERPL W P-5'-P-CCNC: 17 U/L (ref 7–45)
ANION GAP SERPL CALC-SCNC: 13 MMOL/L (ref 10–20)
AST SERPL W P-5'-P-CCNC: 29 U/L (ref 9–39)
BASOPHILS # BLD AUTO: 0.03 X10*3/UL (ref 0–0.1)
BASOPHILS NFR BLD AUTO: 0.6 %
BILIRUB SERPL-MCNC: 0.5 MG/DL (ref 0–1.2)
BUN SERPL-MCNC: 18 MG/DL (ref 6–23)
CALCIUM SERPL-MCNC: 9.5 MG/DL (ref 8.6–10.6)
CHLORIDE SERPL-SCNC: 110 MMOL/L (ref 98–107)
CHOLEST SERPL-MCNC: 165 MG/DL (ref 0–199)
CHOLESTEROL/HDL RATIO: 3.5
CO2 SERPL-SCNC: 26 MMOL/L (ref 21–32)
CREAT SERPL-MCNC: 1.35 MG/DL (ref 0.5–1.05)
EOSINOPHIL # BLD AUTO: 0.09 X10*3/UL (ref 0–0.4)
EOSINOPHIL NFR BLD AUTO: 1.7 %
ERYTHROCYTE [DISTWIDTH] IN BLOOD BY AUTOMATED COUNT: 13.4 % (ref 11.5–14.5)
EST. AVERAGE GLUCOSE BLD GHB EST-MCNC: 189 MG/DL
GFR SERPL CREATININE-BSD FRML MDRD: 41 ML/MIN/1.73M*2
GLUCOSE SERPL-MCNC: 113 MG/DL (ref 74–99)
HBA1C MFR BLD: 8.2 %
HCT VFR BLD AUTO: 38.7 % (ref 36–46)
HDLC SERPL-MCNC: 47 MG/DL
HGB BLD-MCNC: 12.3 G/DL (ref 12–16)
IMM GRANULOCYTES # BLD AUTO: 0.01 X10*3/UL (ref 0–0.5)
IMM GRANULOCYTES NFR BLD AUTO: 0.2 % (ref 0–0.9)
LDLC SERPL CALC-MCNC: 89 MG/DL
LYMPHOCYTES # BLD AUTO: 1.61 X10*3/UL (ref 0.8–3)
LYMPHOCYTES NFR BLD AUTO: 31.2 %
MCH RBC QN AUTO: 29.9 PG (ref 26–34)
MCHC RBC AUTO-ENTMCNC: 31.8 G/DL (ref 32–36)
MCV RBC AUTO: 94 FL (ref 80–100)
MONOCYTES # BLD AUTO: 0.43 X10*3/UL (ref 0.05–0.8)
MONOCYTES NFR BLD AUTO: 8.3 %
NEUTROPHILS # BLD AUTO: 2.99 X10*3/UL (ref 1.6–5.5)
NEUTROPHILS NFR BLD AUTO: 58 %
NON HDL CHOLESTEROL: 118 MG/DL (ref 0–149)
NRBC BLD-RTO: 0 /100 WBCS (ref 0–0)
PLATELET # BLD AUTO: 255 X10*3/UL (ref 150–450)
POC APPEARANCE, URINE: CLEAR
POC BILIRUBIN, URINE: NEGATIVE
POC BLOOD, URINE: NEGATIVE
POC COLOR, URINE: YELLOW
POC GLUCOSE, URINE: ABNORMAL MG/DL
POC KETONES, URINE: NEGATIVE MG/DL
POC LEUKOCYTES, URINE: NEGATIVE
POC NITRITE,URINE: NEGATIVE
POC PH, URINE: 5.5 PH
POC PROTEIN, URINE: ABNORMAL MG/DL
POC SPECIFIC GRAVITY, URINE: 1.02
POC UROBILINOGEN, URINE: 0.2 EU/DL
POTASSIUM SERPL-SCNC: 4.9 MMOL/L (ref 3.5–5.3)
PROT SERPL-MCNC: 7.3 G/DL (ref 6.4–8.2)
RBC # BLD AUTO: 4.11 X10*6/UL (ref 4–5.2)
SODIUM SERPL-SCNC: 144 MMOL/L (ref 136–145)
TRIGL SERPL-MCNC: 146 MG/DL (ref 0–149)
VLDL: 29 MG/DL (ref 0–40)
WBC # BLD AUTO: 5.2 X10*3/UL (ref 4.4–11.3)

## 2023-12-19 PROCEDURE — 87086 URINE CULTURE/COLONY COUNT: CPT

## 2023-12-19 PROCEDURE — 36415 COLL VENOUS BLD VENIPUNCTURE: CPT

## 2023-12-19 PROCEDURE — 1159F MED LIST DOCD IN RCRD: CPT | Performed by: INTERNAL MEDICINE

## 2023-12-19 PROCEDURE — 99214 OFFICE O/P EST MOD 30 MIN: CPT | Performed by: INTERNAL MEDICINE

## 2023-12-19 PROCEDURE — 85025 COMPLETE CBC W/AUTO DIFF WBC: CPT

## 2023-12-19 PROCEDURE — 83540 ASSAY OF IRON: CPT

## 2023-12-19 PROCEDURE — 1160F RVW MEDS BY RX/DR IN RCRD: CPT | Performed by: INTERNAL MEDICINE

## 2023-12-19 PROCEDURE — 80053 COMPREHEN METABOLIC PANEL: CPT

## 2023-12-19 PROCEDURE — 3074F SYST BP LT 130 MM HG: CPT | Performed by: INTERNAL MEDICINE

## 2023-12-19 PROCEDURE — 80061 LIPID PANEL: CPT

## 2023-12-19 PROCEDURE — 81003 URINALYSIS AUTO W/O SCOPE: CPT | Performed by: INTERNAL MEDICINE

## 2023-12-19 PROCEDURE — 4010F ACE/ARB THERAPY RXD/TAKEN: CPT | Performed by: INTERNAL MEDICINE

## 2023-12-19 PROCEDURE — 1036F TOBACCO NON-USER: CPT | Performed by: INTERNAL MEDICINE

## 2023-12-19 PROCEDURE — 3052F HG A1C>EQUAL 8.0%<EQUAL 9.0%: CPT | Performed by: INTERNAL MEDICINE

## 2023-12-19 PROCEDURE — 82728 ASSAY OF FERRITIN: CPT

## 2023-12-19 PROCEDURE — 1126F AMNT PAIN NOTED NONE PRSNT: CPT | Performed by: INTERNAL MEDICINE

## 2023-12-19 PROCEDURE — 3048F LDL-C <100 MG/DL: CPT | Performed by: INTERNAL MEDICINE

## 2023-12-19 PROCEDURE — 83550 IRON BINDING TEST: CPT

## 2023-12-19 PROCEDURE — 3078F DIAST BP <80 MM HG: CPT | Performed by: INTERNAL MEDICINE

## 2023-12-19 PROCEDURE — 83036 HEMOGLOBIN GLYCOSYLATED A1C: CPT

## 2023-12-19 RX ORDER — GUAIFENESIN 600 MG/1
1200 TABLET, EXTENDED RELEASE ORAL 2 TIMES DAILY
COMMUNITY
End: 2024-01-17 | Stop reason: ALTCHOICE

## 2023-12-19 ASSESSMENT — PATIENT HEALTH QUESTIONNAIRE - PHQ9
SUM OF ALL RESPONSES TO PHQ9 QUESTIONS 1 AND 2: 1
1. LITTLE INTEREST OR PLEASURE IN DOING THINGS: SEVERAL DAYS
2. FEELING DOWN, DEPRESSED OR HOPELESS: NOT AT ALL

## 2023-12-20 NOTE — PROGRESS NOTES
Subjective   Patient ID: Janay Hilliard is a 73 y.o. female who presents for hospital discharge (EP.  Hospital discharge.  Was in for pneumonia for R lung.  Feeling better.).  HPI  Patient presents today for hospital follow-up.  She was discharged the same day after Thanksgiving she was admitted out of state she was in [place] visiting her sister.  She states that she had been there for about 3 and half weeks when she came down with dizziness lightheadedness cough some confusion.  Her sister took her to the emergency room and she was admitted diagnosed with a UTI and pneumonia.  I reviewed labs her daughter's phone today she had access to the patient's chart there.  She did have a right upper and right middle lobe pneumonia.  We were unable to locate the urine culture but her daughter believes she had a urinary tract infection also patient had a slightly elevated creatinine although this is somewhat chronic for her they have to hold her lisinopril until she had her labs rechecked.  They had called me about a week ago stating that the patient was having issues with elevated blood pressure and sometimes low blood pressures.  She was previously taking 5 mg twice daily.  She resumed 5 mg daily.  Her blood pressures are still a bit on the low side not any lower than it has been in the past.  She states that she is having dizziness that is normal for her and unchanged.  She had a blood work done today on the way into the office.  She had not done a repeat urinalysis.  We discussed the fact that she needs to get a repeat chest x-ray within the month and they are agreeable to doing so.  Patient already has a routine scheduled appointment to see me in January.  They feel she is back to baseline she is eating well she is drinking fluids well her urination is normal she denies chest pains headaches dizziness lightheadedness or shortness of breath    Review of Systems  Review of systems was performed and is otherwise negative except  "as noted in HPI.  Objective   /70   Pulse 90   Temp 36.8 °C (98.2 °F) (Oral)   Ht 1.549 m (5' 1\")   Wt 47.2 kg (104 lb)   SpO2 99%   BMI 19.65 kg/m²    Physical Exam  HEENT is normal  Lungs clear bilaterally  Heart is regular rate rhythm no murmurs  Abdomen benign  Lower extremities no edema    Assessment/Plan   Diagnoses and all orders for this visit:  Pneumonia of right upper lobe due to infectious organism  -     XR chest 2 views; Future  Dysuria  -     POCT UA Automated manually resulted  -     Urine Culture    Call with issues  Follow-up with me in 3 weeks  Chest x-ray around the end of December.  We repeated UA and culture she will continue lisinopril 5 mg she has routine follow-up set up with nephrology will await blood work results and notify them when they are done  Taylor Nieto MD   "

## 2023-12-21 DIAGNOSIS — R79.9 ABNORMAL BLOOD CHEMISTRY: Primary | ICD-10-CM

## 2023-12-21 LAB
BACTERIA UR CULT: NORMAL
FERRITIN SERPL-MCNC: 103 NG/ML (ref 8–150)
IRON SATN MFR SERPL: 31 % (ref 25–45)
IRON SERPL-MCNC: 99 UG/DL (ref 35–150)
TIBC SERPL-MCNC: 320 UG/DL (ref 240–445)
UIBC SERPL-MCNC: 221 UG/DL (ref 110–370)

## 2024-01-04 ENCOUNTER — HOSPITAL ENCOUNTER (OUTPATIENT)
Dept: CARDIOLOGY | Facility: CLINIC | Age: 74
Discharge: HOME | End: 2024-01-04
Payer: MEDICARE

## 2024-01-04 ENCOUNTER — ANCILLARY PROCEDURE (OUTPATIENT)
Dept: RADIOLOGY | Facility: CLINIC | Age: 74
End: 2024-01-04
Payer: MEDICARE

## 2024-01-04 ENCOUNTER — LAB (OUTPATIENT)
Dept: LAB | Facility: LAB | Age: 74
End: 2024-01-04
Payer: MEDICARE

## 2024-01-04 DIAGNOSIS — D47.2 MONOCLONAL GAMMOPATHY: ICD-10-CM

## 2024-01-04 DIAGNOSIS — I10 ESSENTIAL (PRIMARY) HYPERTENSION: Primary | ICD-10-CM

## 2024-01-04 DIAGNOSIS — N18.30 CHRONIC KIDNEY DISEASE, STAGE 3 UNSPECIFIED (MULTI): ICD-10-CM

## 2024-01-04 DIAGNOSIS — I35.9 NONRHEUMATIC AORTIC VALVE DISORDER, UNSPECIFIED: ICD-10-CM

## 2024-01-04 DIAGNOSIS — N25.81 SECONDARY HYPERPARATHYROIDISM OF RENAL ORIGIN (MULTI): ICD-10-CM

## 2024-01-04 PROCEDURE — 80069 RENAL FUNCTION PANEL: CPT

## 2024-01-04 PROCEDURE — 82570 ASSAY OF URINE CREATININE: CPT

## 2024-01-04 PROCEDURE — 83970 ASSAY OF PARATHORMONE: CPT

## 2024-01-04 PROCEDURE — 82306 VITAMIN D 25 HYDROXY: CPT

## 2024-01-04 PROCEDURE — 93306 TTE W/DOPPLER COMPLETE: CPT

## 2024-01-04 PROCEDURE — 83521 IG LIGHT CHAINS FREE EACH: CPT

## 2024-01-04 PROCEDURE — 86325 OTHER IMMUNOELECTROPHORESIS: CPT | Performed by: INTERNAL MEDICINE

## 2024-01-04 PROCEDURE — 84166 PROTEIN E-PHORESIS/URINE/CSF: CPT

## 2024-01-04 PROCEDURE — 86335 IMMUNFIX E-PHORSIS/URINE/CSF: CPT

## 2024-01-04 PROCEDURE — 36415 COLL VENOUS BLD VENIPUNCTURE: CPT

## 2024-01-04 PROCEDURE — 84166 PROTEIN E-PHORESIS/URINE/CSF: CPT | Performed by: INTERNAL MEDICINE

## 2024-01-04 PROCEDURE — 71046 X-RAY EXAM CHEST 2 VIEWS: CPT

## 2024-01-04 PROCEDURE — 82043 UR ALBUMIN QUANTITATIVE: CPT

## 2024-01-04 PROCEDURE — 84156 ASSAY OF PROTEIN URINE: CPT

## 2024-01-04 PROCEDURE — 93306 TTE W/DOPPLER COMPLETE: CPT | Performed by: STUDENT IN AN ORGANIZED HEALTH CARE EDUCATION/TRAINING PROGRAM

## 2024-01-05 LAB
25(OH)D3 SERPL-MCNC: 41 NG/ML (ref 30–100)
ALBUMIN SERPL BCP-MCNC: 4.2 G/DL (ref 3.4–5)
ANION GAP SERPL CALC-SCNC: 12 MMOL/L (ref 10–20)
BUN SERPL-MCNC: 24 MG/DL (ref 6–23)
CALCIUM SERPL-MCNC: 9.5 MG/DL (ref 8.6–10.6)
CHLORIDE SERPL-SCNC: 106 MMOL/L (ref 98–107)
CO2 SERPL-SCNC: 26 MMOL/L (ref 21–32)
CREAT SERPL-MCNC: 1.32 MG/DL (ref 0.5–1.05)
CREAT UR-MCNC: 102 MG/DL (ref 20–320)
GFR SERPL CREATININE-BSD FRML MDRD: 42 ML/MIN/1.73M*2
GLUCOSE SERPL-MCNC: 220 MG/DL (ref 74–99)
KAPPA LC SERPL-MCNC: 5.58 MG/DL (ref 0.33–1.94)
KAPPA LC/LAMBDA SER: 1.15 {RATIO} (ref 0.26–1.65)
LAMBDA LC SERPL-MCNC: 4.87 MG/DL (ref 0.57–2.63)
MICROALBUMIN UR-MCNC: 394.6 MG/L
MICROALBUMIN/CREAT UR: 386.9 UG/MG CREAT
PHOSPHATE SERPL-MCNC: 3.9 MG/DL (ref 2.5–4.9)
POTASSIUM SERPL-SCNC: 4.2 MMOL/L (ref 3.5–5.3)
PROT UR-ACNC: 72 MG/DL (ref 5–25)
PTH-INTACT SERPL-MCNC: 94.1 PG/ML (ref 18.5–88)
SODIUM SERPL-SCNC: 140 MMOL/L (ref 136–145)

## 2024-01-09 ENCOUNTER — TELEPHONE (OUTPATIENT)
Dept: PRIMARY CARE | Facility: CLINIC | Age: 74
End: 2024-01-09

## 2024-01-09 ENCOUNTER — APPOINTMENT (OUTPATIENT)
Dept: PRIMARY CARE | Facility: CLINIC | Age: 74
End: 2024-01-09
Payer: MEDICARE

## 2024-01-09 LAB
ALBUMIN MFR UR ELPH: 71.6 %
ALPHA1 GLOB MFR UR ELPH: 4.6 %
ALPHA2 GLOB MFR UR ELPH: 6.5 %
AORTIC VALVE MEAN GRADIENT: 24.6
AORTIC VALVE PEAK VELOCITY: 3.26
AV PEAK GRADIENT: 42.6
AVA (PEAK VEL): 0.93
AVA (VTI): 1
B-GLOBULIN MFR UR ELPH: 10.7 %
EJECTION FRACTION APICAL 4 CHAMBER: 74
EJECTION FRACTION: 79
GAMMA GLOB MFR UR ELPH: 6.6 %
IMMUNOFIXATION COMMENT: ABNORMAL
LEFT ATRIUM VOLUME AREA LENGTH INDEX BSA: 20.4
LEFT VENTRICLE INTERNAL DIMENSION DIASTOLE: 2.72 (ref 3.5–6)
LEFT VENTRICULAR OUTFLOW TRACT DIAMETER: 1.89
M-PROTEIN 1 URINE %: 0.9 %
MITRAL VALVE E/A RATIO: 1.04
MITRAL VALVE E/E' RATIO: 29.66
PATH REVIEW - URINE IMMUNOFIXATION: ABNORMAL
PATH REVIEW-URINE PROTEIN ELECTROPHORESIS: ABNORMAL
RIGHT VENTRICLE FREE WALL PEAK S': 8
RIGHT VENTRICLE PEAK SYSTOLIC PRESSURE: 21.3
TRICUSPID ANNULAR PLANE SYSTOLIC EXCURSION: 1.4
URINE ELECTROPHORESIS COMMENT: ABNORMAL

## 2024-01-09 NOTE — TELEPHONE ENCOUNTER
Spoke with marcin Kinney and she will take patient to ER, she wanted to know what the chest X-Rays showed, she has not heard anything yet, please call back thank you

## 2024-01-11 ENCOUNTER — DOCUMENTATION (OUTPATIENT)
Dept: PRIMARY CARE | Facility: CLINIC | Age: 74
End: 2024-01-11
Payer: MEDICARE

## 2024-01-11 ENCOUNTER — PATIENT OUTREACH (OUTPATIENT)
Dept: PRIMARY CARE | Facility: CLINIC | Age: 74
End: 2024-01-11
Payer: MEDICARE

## 2024-01-11 NOTE — PROGRESS NOTES
Discharge Facility: [facility]  Discharge Diagnosis: Positional lightheadedness   Admission Date: 1/9/2024  Discharge Date: 1/10/2024    PCP Appointment Date: 1/17/2024  Specialist Appointment Date: 1/23 audiology; 1/23 ENT  Hospital Encounter and Summary: Linked   See discharge assessment below for further details  Engagement  Call Start Time: 1039 (1/11/2024 10:44 AM)    Medications  Medications reviewed with patient/caregiver?: Yes (lisinopril) (1/11/2024 10:44 AM)  Is the patient having any side effects they believe may be caused by any medication additions or changes?: No (1/11/2024 10:44 AM)  Does the patient have all medications ordered at discharge?: Yes (1/11/2024 10:44 AM)  Prescription Comments: see med list (1/11/2024 10:44 AM)  Is the patient taking all medications as directed (includes completed medication regime)?: Yes (1/11/2024 10:44 AM)  Care Management Interventions: Provided patient education; Advised patient to call provider (1/11/2024 10:44 AM)  Medication Comments: States they are concerned with patient's BPs and if the medication is too little for her. Instructed for her to take BP at least daily an hour after medication to make sure BP is not going too low. (1/11/2024 10:44 AM)    Appointments  Does the patient have a primary care provider?: Yes (1/11/2024 10:44 AM)  Care Management Interventions: Verified appointment date/time/provider (1/11/2024 10:44 AM)  Has the patient kept scheduled appointments due by today?: Yes (1/11/2024 10:44 AM)  Care Management Interventions: Advised patient to keep appointment (1/11/2024 10:44 AM)    Self Management  What is the home health agency?: Denies need (1/11/2024 10:44 AM)    Patient Teaching  Does the patient have access to their discharge instructions?: Yes (1/11/2024 10:44 AM)  Care Management Interventions: Reviewed instructions with patient (1/11/2024 10:44 AM)  What is the patient's perception of their health status since discharge?:  "Improving (1/11/2024 10:44 AM)  Is the patient/caregiver able to teach back the hierarchy of who to call/visit for symptoms/problems? PCP, Specialist, Home Health nurse, Urgent Care, ED, 911: Yes (1/11/2024 10:44 AM)  Patient/Caregiver Education Comments: Spoke to patient's daughter with patient's permission at time of outreach call. Patient is doing well and continues to live on her own at home. Is able to do ADLs by self per daughter. States \"they believe she was just really dehydrated\" and that it can be a struggle for the patient to drink enough fluids in a day. Medications and follow up appts discussed with patient's daughter. (1/11/2024 10:44 AM)        "

## 2024-01-16 PROBLEM — M79.674 PAIN OF TOE OF RIGHT FOOT: Status: ACTIVE | Noted: 2024-01-16

## 2024-01-16 PROBLEM — R42 POSITIONAL LIGHTHEADEDNESS: Status: ACTIVE | Noted: 2024-01-09

## 2024-01-16 PROBLEM — R26.81 UNSTEADY GAIT: Status: ACTIVE | Noted: 2024-01-09

## 2024-01-16 PROBLEM — H93.13 TINNITUS OF BOTH EARS: Status: ACTIVE | Noted: 2024-01-16

## 2024-01-16 PROBLEM — B35.1 ONYCHOMYCOSIS DUE TO DERMATOPHYTE: Status: ACTIVE | Noted: 2024-01-16

## 2024-01-16 PROBLEM — M79.675 PAIN OF TOE OF LEFT FOOT: Status: ACTIVE | Noted: 2024-01-16

## 2024-01-16 RX ORDER — AMLODIPINE BESYLATE 5 MG/1
TABLET ORAL
COMMUNITY
Start: 2021-05-05 | End: 2024-01-17 | Stop reason: ALTCHOICE

## 2024-01-16 RX ORDER — HYDRALAZINE HYDROCHLORIDE 25 MG/1
TABLET, FILM COATED ORAL
COMMUNITY
Start: 2023-11-28 | End: 2024-01-17 | Stop reason: ALTCHOICE

## 2024-01-16 RX ORDER — INSULIN GLARGINE 100 [IU]/ML
INJECTION, SOLUTION SUBCUTANEOUS
COMMUNITY
Start: 2021-05-05 | End: 2024-01-17 | Stop reason: ALTCHOICE

## 2024-01-16 RX ORDER — ASPIRIN 81 MG/1
81 TABLET ORAL
COMMUNITY
Start: 2023-01-16 | End: 2024-01-17 | Stop reason: ALTCHOICE

## 2024-01-16 RX ORDER — INSULIN LISPRO 100 [IU]/ML
INJECTION, SOLUTION INTRAVENOUS; SUBCUTANEOUS
COMMUNITY
Start: 2024-01-09 | End: 2024-01-17 | Stop reason: ALTCHOICE

## 2024-01-16 RX ORDER — CYCLOSPORINE 0.5 MG/ML
EMULSION OPHTHALMIC
COMMUNITY

## 2024-01-16 RX ORDER — IBANDRONATE SODIUM 150 MG/1
1 TABLET, FILM COATED ORAL
COMMUNITY
Start: 2023-03-07 | End: 2024-01-17 | Stop reason: ALTCHOICE

## 2024-01-17 ENCOUNTER — OFFICE VISIT (OUTPATIENT)
Dept: PRIMARY CARE | Facility: CLINIC | Age: 74
End: 2024-01-17
Payer: MEDICARE

## 2024-01-17 VITALS
BODY MASS INDEX: 18.95 KG/M2 | HEART RATE: 92 BPM | SYSTOLIC BLOOD PRESSURE: 102 MMHG | WEIGHT: 103 LBS | OXYGEN SATURATION: 97 % | TEMPERATURE: 98.2 F | DIASTOLIC BLOOD PRESSURE: 74 MMHG | HEIGHT: 62 IN

## 2024-01-17 DIAGNOSIS — N25.81 SECONDARY HYPERPARATHYROIDISM OF RENAL ORIGIN (MULTI): ICD-10-CM

## 2024-01-17 DIAGNOSIS — Z79.4 TYPE 2 DIABETES MELLITUS WITH STABLE PROLIFERATIVE RETINOPATHY OF BOTH EYES, WITH LONG-TERM CURRENT USE OF INSULIN (MULTI): ICD-10-CM

## 2024-01-17 DIAGNOSIS — I10 BENIGN ESSENTIAL HTN: ICD-10-CM

## 2024-01-17 DIAGNOSIS — R42 DIZZINESS: Primary | ICD-10-CM

## 2024-01-17 DIAGNOSIS — R42 POSITIONAL LIGHTHEADEDNESS: ICD-10-CM

## 2024-01-17 DIAGNOSIS — E11.3553 TYPE 2 DIABETES MELLITUS WITH STABLE PROLIFERATIVE RETINOPATHY OF BOTH EYES, WITH LONG-TERM CURRENT USE OF INSULIN (MULTI): ICD-10-CM

## 2024-01-17 DIAGNOSIS — I73.9 PERIPHERAL VASCULAR DISEASE, UNSPECIFIED (CMS-HCC): ICD-10-CM

## 2024-01-17 DIAGNOSIS — E46 PROTEIN-CALORIE MALNUTRITION, UNSPECIFIED SEVERITY (MULTI): ICD-10-CM

## 2024-01-17 PROCEDURE — 1159F MED LIST DOCD IN RCRD: CPT | Performed by: INTERNAL MEDICINE

## 2024-01-17 PROCEDURE — 99496 TRANSJ CARE MGMT HIGH F2F 7D: CPT | Performed by: INTERNAL MEDICINE

## 2024-01-17 PROCEDURE — 1160F RVW MEDS BY RX/DR IN RCRD: CPT | Performed by: INTERNAL MEDICINE

## 2024-01-17 PROCEDURE — 3062F POS MACROALBUMINURIA REV: CPT | Performed by: INTERNAL MEDICINE

## 2024-01-17 PROCEDURE — 4010F ACE/ARB THERAPY RXD/TAKEN: CPT | Performed by: INTERNAL MEDICINE

## 2024-01-17 PROCEDURE — 3074F SYST BP LT 130 MM HG: CPT | Performed by: INTERNAL MEDICINE

## 2024-01-17 PROCEDURE — 1126F AMNT PAIN NOTED NONE PRSNT: CPT | Performed by: INTERNAL MEDICINE

## 2024-01-17 PROCEDURE — 1036F TOBACCO NON-USER: CPT | Performed by: INTERNAL MEDICINE

## 2024-01-17 PROCEDURE — 3078F DIAST BP <80 MM HG: CPT | Performed by: INTERNAL MEDICINE

## 2024-01-17 ASSESSMENT — ENCOUNTER SYMPTOMS
DEPRESSION: 0
LOSS OF SENSATION IN FEET: 0
OCCASIONAL FEELINGS OF UNSTEADINESS: 0

## 2024-01-17 ASSESSMENT — PATIENT HEALTH QUESTIONNAIRE - PHQ9
SUM OF ALL RESPONSES TO PHQ9 QUESTIONS 1 AND 2: 0
1. LITTLE INTEREST OR PLEASURE IN DOING THINGS: NOT AT ALL
2. FEELING DOWN, DEPRESSED OR HOPELESS: NOT AT ALL

## 2024-01-17 NOTE — PROGRESS NOTES
"Patient: Janay Hilliard  : 1950  PCP: Taylor Nieto MD  MRN: [id number]  Program: No linked episodes     Janay Hilliard is a 73 y.o. female presenting today for follow-up after being discharged from the hospital 9 days ago. The main problem requiring admission was dehydration  She had not been consuming fluids well she was dizzy and lightheaded her balance was off she was kept for 24 hours and hydrated. The discharge summary and/or Transitional Care Management documentation was reviewed. Medication reconciliation was performed as indicated via the \"Dre as Reviewed\" timestamp.     Janay Hilliard was contacted by Transitional Care Management services two days after her discharge. This encounter and supporting documentation was reviewed.    She has been working more on fluid and hydration.  She sees endocrinology and nephrology.  She presents with daughter today who confirms she has been drinking more fluids.  Patient is feeling overall better  The complexity of medical decision making for this patient's transitional care is moderate.    Physical Exam  HEENT is normal  Lungs clear bilaterally  Heart is regular rate rhythm no murmurs  Abdomen benign  Lower extremities no edema      Assessment/Plan   Diagnoses and all orders for this visit:  Dizziness  Positional lightheadedness  Benign essential HTN  Protein-calorie malnutrition, unspecified severity (CMS/HCC)  Type 2 diabetes mellitus with stable proliferative retinopathy of both eyes, with long-term current use of insulin (CMS/HCC)  Peripheral vascular disease, unspecified (CMS/HCC)  Secondary hyperparathyroidism of renal origin (CMS/HCC)      Review of Systems    Review of systems was performed and is otherwise negative except as noted in HPI.    Family History   Problem Relation Name Age of Onset    Diabetes Father      Diabetes Paternal Grandmother         Engagement  Call Start Time: 1039 (2024 10:44 AM)    Medications  Medications reviewed with " patient/caregiver?: Yes (lisinopril) (1/11/2024 10:44 AM)  Is the patient having any side effects they believe may be caused by any medication additions or changes?: No (1/11/2024 10:44 AM)  Does the patient have all medications ordered at discharge?: Yes (1/11/2024 10:44 AM)  Prescription Comments: see med list (1/11/2024 10:44 AM)  Is the patient taking all medications as directed (includes completed medication regime)?: Yes (1/11/2024 10:44 AM)  Care Management Interventions: Provided patient education; Advised patient to call provider (1/11/2024 10:44 AM)  Medication Comments: States they are concerned with patient's BPs and if the medication is too little for her. Instructed for her to take BP at least daily an hour after medication to make sure BP is not going too low. (1/11/2024 10:44 AM)    Appointments  Does the patient have a primary care provider?: Yes (1/11/2024 10:44 AM)  Care Management Interventions: Verified appointment date/time/provider (1/11/2024 10:44 AM)  Has the patient kept scheduled appointments due by today?: Yes (1/11/2024 10:44 AM)  Care Management Interventions: Advised patient to keep appointment (1/11/2024 10:44 AM)    Self Management  What is the home health agency?: Denies need (1/11/2024 10:44 AM)    Patient Teaching  Does the patient have access to their discharge instructions?: Yes (1/11/2024 10:44 AM)  Care Management Interventions: Reviewed instructions with patient (1/11/2024 10:44 AM)  What is the patient's perception of their health status since discharge?: Improving (1/11/2024 10:44 AM)  Is the patient/caregiver able to teach back the hierarchy of who to call/visit for symptoms/problems? PCP, Specialist, Home Health nurse, Urgent Care, ED, 911: Yes (1/11/2024 10:44 AM)  Patient/Caregiver Education Comments: Spoke to patient's daughter with patient's permission at time of outreach call. Patient is doing well and continues to live on her own at home. Is able to do ADLs by self  "per daughter. States \"they believe she was just really dehydrated\" and that it can be a struggle for the patient to drink enough fluids in a day. Medications and follow up appts discussed with patient's daughter. (1/11/2024 10:44 AM)        Follow-up with me in 3 months this will be Medicare wellness blood work ordered  She will see nephrology and follow-up with endocrinology  Keep routine follow-ups with cardiology  "

## 2024-01-19 ENCOUNTER — PATIENT OUTREACH (OUTPATIENT)
Dept: PRIMARY CARE | Facility: CLINIC | Age: 74
End: 2024-01-19
Payer: MEDICARE

## 2024-01-19 NOTE — PROGRESS NOTES
Unable to reach patient for call back after patient's follow up appointment with PCP.   MANUELM with call back number for patient to call if needed   If no voicemail available call attempts x 2 were made to contact the patient to assist with any questions or concerns patient may have.

## 2024-01-20 NOTE — PROGRESS NOTES
Subjective   Patient ID: Janay Hilliard is a 73 y.o. female who presents for No chief complaint on file..  HPI  This 73-year-old female with a history of type 2 diabetes, proliferative retinopathy of both eyes, history of peripheral vascular disease and secondary hyperparathyroidism is being seen in follow-up primarily over logically.  She is also status post a CVA number of years ago.  Hearing evaluation 1 year ago revealed a moderate to severe high-frequency hearing loss in both ears with some asymmetry at 1000 Hz left greater than right.  Speech discrimination was 100% bilaterally.  She also had difficulties with cerumen impactions when seen a year ago.  This required debridement. She was hospitalized early part of January for dehydration causing dizziness and lightheadedness.  Review of Systems    Objective   Physical Exam  EXAMINATION:     GENERAL HERMINIA.EARANCE: Alert, in no acute distress, normal pitch and clarity of voice, well-developed and nourished, cooperative.     HEAD/FACE: Normocephalic, atraumatic, normal facial movements and strength, no no tenderness to palpation, no lesions noted.     SKIN: Normal turgor, no raised or ulcerative lesions, warm and dry to palpation.     EYES: Weak eye movements were noted bilaterally. Visual acuity is weak.     EARS: Both ears--external ear anatomy is normal without lesions, auditory canals are patent and without skin abrasions or lesions, cerumen obstructs the left ear and is removed microscopically, hearing is intact to voice, tympanic membranes are intact with no acute inflammation, light reflexes present, no effusions are noted and no mastoid tenderness found to palpation.     NOSE: No external skin lesions are noted, nares are patent, septum is intact, sinuses are nontender to palpation bilaterally, no intranasal lesions or inflammation is noted, nasal valve is normal.     OROPHARYNX/ORAL CAVITY: Oropharynx is not inflamed and is without lesions, mucosa of the oral  cavity is intact and without lesions, tongue is midline and mobile, no acute dental disease is noted, TMJs are mobile     NECK: No lymphadenopathy is palpated, carotid pulses are intact, neck is supple with full range of motion, no thyroid abnormalities are noted, trachea is midline, no neck masses are palpated.     LYMPHATICS: No cervical adenopathy or supraclavicular adenopathy is palpated.     NEUROLOGIC/PSYCH; alert and oriented, cranial nerves are grossly intact, gait is without falling, no motor deficits are noted.    Her audiogram today on the right ear revealed evidence for low normal hearing up to 1000 Hz then a moderate sensory hearing loss was noted up through 4000 Hz moderately severe in the upper frequencies of sound.  On the left-hand side there was normal hearing up to 500 Hz then a moderate hearing loss is noted from 750 Hz up through 4000 Hz moderately severe in the upper frequencies of sound.  Discrimination scores are 100% bilaterally at 60 dB.  Tympanograms were normal    Patient ID: Janay Hilliard is a 73 y.o. female.    Procedures  Assessment/Plan   Problem List Items Addressed This Visit             ICD-10-CM    Bilateral sensorineural hearing loss - Primary H90.3    Tinnitus of both ears H93.13        I discussed the present hearing test findings with the patient. Since the last test there has been no significant change in the hearing of individual frequencies sound. Discrimination ability remains basically unchanged. It would be advised that a yearly audiogram be done unless symptoms develop in regards to progressive loss, new onset vertigo, or changes regarding tinnitus. Avoidance of loud noise without ear protection is advised. Rehabilitation using hearing aids is advised.    Kalin Jacobson DMD, MD 01/20/24 12:36 PM

## 2024-01-23 ENCOUNTER — CLINICAL SUPPORT (OUTPATIENT)
Dept: AUDIOLOGY | Facility: CLINIC | Age: 74
End: 2024-01-23
Payer: MEDICARE

## 2024-01-23 ENCOUNTER — OFFICE VISIT (OUTPATIENT)
Dept: OTOLARYNGOLOGY | Facility: CLINIC | Age: 74
End: 2024-01-23
Payer: MEDICARE

## 2024-01-23 VITALS — BODY MASS INDEX: 18.95 KG/M2 | HEIGHT: 62 IN | WEIGHT: 103 LBS

## 2024-01-23 DIAGNOSIS — H93.12 TINNITUS OF LEFT EAR: ICD-10-CM

## 2024-01-23 DIAGNOSIS — H90.3 SENSORINEURAL HEARING LOSS (SNHL) OF BOTH EARS: Primary | ICD-10-CM

## 2024-01-23 DIAGNOSIS — H93.13 TINNITUS OF BOTH EARS: ICD-10-CM

## 2024-01-23 DIAGNOSIS — H90.3 BILATERAL SENSORINEURAL HEARING LOSS: Primary | ICD-10-CM

## 2024-01-23 PROCEDURE — 99214 OFFICE O/P EST MOD 30 MIN: CPT | Performed by: OTOLARYNGOLOGY

## 2024-01-23 PROCEDURE — HRANC PR HEARING AID NO CHARGE: Performed by: AUDIOLOGIST

## 2024-01-23 PROCEDURE — 1126F AMNT PAIN NOTED NONE PRSNT: CPT | Performed by: OTOLARYNGOLOGY

## 2024-01-23 PROCEDURE — 92557 COMPREHENSIVE HEARING TEST: CPT | Performed by: AUDIOLOGIST

## 2024-01-23 PROCEDURE — 4010F ACE/ARB THERAPY RXD/TAKEN: CPT | Performed by: OTOLARYNGOLOGY

## 2024-01-23 PROCEDURE — 92567 TYMPANOMETRY: CPT | Performed by: AUDIOLOGIST

## 2024-01-23 PROCEDURE — 1036F TOBACCO NON-USER: CPT | Performed by: OTOLARYNGOLOGY

## 2024-01-23 PROCEDURE — 1160F RVW MEDS BY RX/DR IN RCRD: CPT | Performed by: OTOLARYNGOLOGY

## 2024-01-23 PROCEDURE — 1159F MED LIST DOCD IN RCRD: CPT | Performed by: OTOLARYNGOLOGY

## 2024-01-23 PROCEDURE — 3062F POS MACROALBUMINURIA REV: CPT | Performed by: OTOLARYNGOLOGY

## 2024-01-23 NOTE — PROGRESS NOTES
COMPREHENSIVE AUDIOMETRIC EVALUATION      Name:  Janay Hilliard  :  1950  Age:  73 y.o.  Date of Evaluation:  24   Referring Provider:  Dr. Jacobson     History:  Ms. Hilliard was seen today for an evaluation of hearing accompanied by her daughter/POA.  Patient reported tinnitus increasing in the left ear.  When asked, patient denied otalgia and concerns for decreased hearing sensitivity    See audiometric evaluation at end of this report or scanned under media tab    OTOSCOPY:       Right Ear: Clear canal with yellow hue to TM       Left Ear: Clear canal with yellow hue to TM    226 Hz TYMPANOMETRY:       Right Ear: Type Ad: hypercompliant with normal peak pressure and ear canal volume       Left Ear: Type A: normal peak pressure, compliance, and ear canal volume, consistent with middle ear function within normal limits    AUDIOMETRIC EVALUATION (Phones):       Right Ear: Normal sloping to Severe, Sensorineural hearing loss                 Left Ear: Normal sloping to Severe, Sensorineural hearing loss        NOTE: Decrease in right ear from 3250-7877 Hz as compared to 23 evaluation, however please note change in transducers from last evaluation.  Left ear consistent with previous evaluation       Test technique:  Standard Audiometry  Reliability:   good    SPEECH RECOGNITION THRESHOLD:       Right Ear:  25 dBHL in good agreement with PTA       Left Ear:  30 dBHL in good agreement with PTA    WORD RECOGNITION:       Right Ear:  excellent (100%) at elevated presentation level       Left Ear:  excellent (100%) at elevated presentation level    DISCUSSION:   Discussed results and recommendations with patient and daughter.  Questions were addressed and the patient was encouraged to contact our department should concerns arise.    RECOMMENDATIONS:  -Recommend patient return should concerns for changes in hearing sensitivity arise or as medically indicated.   -Recommend patient continue consistent utilization  Subjective:  Left ear pain x 2 days       Patient ID: Hermila Ramos is a 6 y.o. female.    Vitals:  temperature is 98.2 °F (36.8 °C). Her pulse is 89. Her respiration is 20 and oxygen saturation is 99%.     Chief Complaint: Otalgia (left ear)    Hermila Ramos is a 6 year old female presenting to the clinic with c/o left ear pain. She was diagnosed with swimmers ear two weeks ago in the right ear. She has complained of pain in the left ear for the past 3 days. The mother has been putting antibiotic drops in the ear. She has had no fever or drainage from the ear. She is UTD on immunizations. She has had no other upper respiratory symptoms.     Otalgia    There is pain in the left ear. This is a recurrent problem. Pertinent negatives include no ear discharge, headaches, neck pain, rash or vomiting.       Constitution: Negative for chills, sweating and fever.   HENT: Positive for ear pain. Negative for ear discharge, tinnitus, facial swelling, congestion and sinus pain.    Neck: Negative for neck pain and neck stiffness.   Eyes: Negative for eye pain, photophobia, vision loss, double vision and blurred vision.   Gastrointestinal: Negative for nausea and vomiting.   Genitourinary: Negative for missed menses.   Musculoskeletal: Negative for trauma and muscle ache.   Skin: Negative for rash, wound and lesion.   Neurological: Negative for headaches.   Psychiatric/Behavioral: Negative for confusion, nervous/anxious, sleep disturbance and depression. The patient is not nervous/anxious.        Objective:      Physical Exam   Constitutional: She appears well-developed and well-nourished. She is active and cooperative.  Non-toxic appearance. She does not appear ill. No distress.   HENT:   Head: Normocephalic and atraumatic. No signs of injury. There is normal jaw occlusion.   Right Ear: Tympanic membrane, external ear, pinna and canal normal.   Left Ear: External ear, pinna and canal normal.   Nose: Nose normal. No nasal  discharge. No signs of injury. No epistaxis in the right nostril. No epistaxis in the left nostril.   Mouth/Throat: Mucous membranes are moist. Oropharynx is clear.   Left ear: unable to visualize TM due to edema and cerumen in canal. No drainage. No mastoid tenderness or erythema   Eyes: Visual tracking is normal. Conjunctivae and lids are normal. Right eye exhibits no discharge and no exudate. Left eye exhibits no discharge and no exudate. No scleral icterus.   Neck: Trachea normal and normal range of motion. Neck supple. No neck rigidity or neck adenopathy. No tenderness is present.   Cardiovascular: Normal rate and regular rhythm. Pulses are strong.   Pulmonary/Chest: Effort normal and breath sounds normal. No respiratory distress. She has no wheezes. She exhibits no retraction.   Abdominal: Soft. Bowel sounds are normal. She exhibits no distension. There is no tenderness.   Musculoskeletal: Normal range of motion. She exhibits no tenderness, deformity or signs of injury.   Neurological: She is alert. She has normal strength.   Skin: Skin is warm and dry. Capillary refill takes less than 2 seconds. No abrasion, no bruising, no burn, no laceration and no rash noted. She is not diaphoretic.   Psychiatric: She has a normal mood and affect. Her speech is normal and behavior is normal. Cognition and memory are normal.   Nursing note and vitals reviewed.      Assessment:       1. Acute otitis externa of left ear, unspecified type        Plan:       Unable to fully visualize TM due to edema and cerumen in canal. Will treat for otitis externa and have patient rechecked by pediatrician in 2-3 days to attempt to assess the TM. Symptoms more consistent with otitis externa (movement pain, edematous canal) but advised mother that she may have AOM that I am unable to diagnose at this time. Discussed specific return precautions and follow up instructions. Do not suspect mastoiditis.   Acute otitis externa of left ear,  of hearing aids.    Reina Villarreal, CCC-A     Appt: 1:00 - 1:30 PM        unspecified type    Other orders  -     ciprofloxacin-dexamethasone 0.3-0.1% (CIPRODEX) 0.3-0.1 % DrpS; Place 4 drops into the left ear 2 (two) times daily.  Dispense: 7.5 mL; Refill: 0

## 2024-01-23 NOTE — PROGRESS NOTES
[facility] AUDIOLOGY  HEARING AID CHECK      Name:  Janya Hilliard  YOB: 1950  Today's date:  01/23/24      PATIENT ISSUES/CONCERNS AND OTOSCOPIC RESULTS  Otoscopic was clear following Dr. Jacobson visit.  Patient reports that she has been doing well with the hearing aids since our last visit.    HEARING AID INSPECTION AND ADJUSTMENT  Hearing aids were cleaned and checked.  Replaced filters and cleaned vents.    Listening check was good.  Analyzer data good.      Made a few minor changes based on today's audiogram.  Good subjective audibility noted in the office.  Patient reports no issues or concerns.  No need for adjustment today.    FOLLOW UP   The patient was asked to contact the office if they notice any significant change in hearing level or hearing aid function.    Otherwise, will follow up again in 1 year in conjunction with Dr. Jacobson visit.    APPOINTMENT TIME  1:30-2:00pm    Reina Perera  Doctor of Audiology  Licensed Audiologist

## 2024-02-14 DIAGNOSIS — I10 BENIGN ESSENTIAL HTN: ICD-10-CM

## 2024-02-14 DIAGNOSIS — K21.00 GASTROESOPHAGEAL REFLUX DISEASE WITH ESOPHAGITIS WITHOUT HEMORRHAGE: ICD-10-CM

## 2024-02-14 DIAGNOSIS — I63.50 CEREBROVASCULAR ACCIDENT (CVA) DUE TO STENOSIS OF CEREBRAL ARTERY (MULTI): ICD-10-CM

## 2024-02-14 RX ORDER — ATORVASTATIN CALCIUM 20 MG/1
20 TABLET, FILM COATED ORAL DAILY
Qty: 90 TABLET | Refills: 0 | Status: SHIPPED | OUTPATIENT
Start: 2024-02-14

## 2024-02-14 RX ORDER — OMEPRAZOLE 20 MG/1
20 CAPSULE, DELAYED RELEASE ORAL
Qty: 90 CAPSULE | Refills: 0 | Status: SHIPPED | OUTPATIENT
Start: 2024-02-14 | End: 2024-05-14

## 2024-02-14 NOTE — TELEPHONE ENCOUNTER
Spoke w/patient, needs refills on  Apixaban 5 mg  Atorvastatin 20 mg  Omeprazole 20 mg  Send to [facility]  Last appt. 1/17/23  Upcoming appt. 4/24/24

## 2024-02-20 ENCOUNTER — OFFICE VISIT (OUTPATIENT)
Dept: CARDIOLOGY | Facility: CLINIC | Age: 74
End: 2024-02-20
Payer: MEDICARE

## 2024-02-20 VITALS
SYSTOLIC BLOOD PRESSURE: 129 MMHG | HEART RATE: 76 BPM | OXYGEN SATURATION: 97 % | HEIGHT: 61 IN | DIASTOLIC BLOOD PRESSURE: 64 MMHG | BODY MASS INDEX: 19.07 KG/M2 | WEIGHT: 101 LBS

## 2024-02-20 DIAGNOSIS — I10 BENIGN ESSENTIAL HTN: ICD-10-CM

## 2024-02-20 DIAGNOSIS — I35.9 AORTIC VALVE DISEASE: Primary | ICD-10-CM

## 2024-02-20 DIAGNOSIS — R06.02 SHORTNESS OF BREATH ON EXERTION: ICD-10-CM

## 2024-02-20 DIAGNOSIS — I73.9 PERIPHERAL VASCULAR DISEASE, UNSPECIFIED (CMS-HCC): ICD-10-CM

## 2024-02-20 DIAGNOSIS — I63.9 CEREBROVASCULAR ACCIDENT (CVA), UNSPECIFIED MECHANISM (MULTI): ICD-10-CM

## 2024-02-20 LAB
ATRIAL RATE: 72 BPM
P AXIS: 75 DEGREES
P OFFSET: 198 MS
P ONSET: 147 MS
PR INTERVAL: 138 MS
Q ONSET: 216 MS
QRS COUNT: 12 BEATS
QRS DURATION: 86 MS
QT INTERVAL: 412 MS
QTC CALCULATION(BAZETT): 451 MS
QTC FREDERICIA: 438 MS
R AXIS: 71 DEGREES
T AXIS: 77 DEGREES
T OFFSET: 422 MS
VENTRICULAR RATE: 72 BPM

## 2024-02-20 PROCEDURE — 1036F TOBACCO NON-USER: CPT | Performed by: STUDENT IN AN ORGANIZED HEALTH CARE EDUCATION/TRAINING PROGRAM

## 2024-02-20 PROCEDURE — 93010 ELECTROCARDIOGRAM REPORT: CPT | Performed by: STUDENT IN AN ORGANIZED HEALTH CARE EDUCATION/TRAINING PROGRAM

## 2024-02-20 PROCEDURE — 99214 OFFICE O/P EST MOD 30 MIN: CPT | Performed by: STUDENT IN AN ORGANIZED HEALTH CARE EDUCATION/TRAINING PROGRAM

## 2024-02-20 PROCEDURE — 3074F SYST BP LT 130 MM HG: CPT | Performed by: STUDENT IN AN ORGANIZED HEALTH CARE EDUCATION/TRAINING PROGRAM

## 2024-02-20 PROCEDURE — 3078F DIAST BP <80 MM HG: CPT | Performed by: STUDENT IN AN ORGANIZED HEALTH CARE EDUCATION/TRAINING PROGRAM

## 2024-02-20 PROCEDURE — 99214 OFFICE O/P EST MOD 30 MIN: CPT | Mod: 25 | Performed by: STUDENT IN AN ORGANIZED HEALTH CARE EDUCATION/TRAINING PROGRAM

## 2024-02-20 PROCEDURE — 3062F POS MACROALBUMINURIA REV: CPT | Performed by: STUDENT IN AN ORGANIZED HEALTH CARE EDUCATION/TRAINING PROGRAM

## 2024-02-20 PROCEDURE — 1160F RVW MEDS BY RX/DR IN RCRD: CPT | Performed by: STUDENT IN AN ORGANIZED HEALTH CARE EDUCATION/TRAINING PROGRAM

## 2024-02-20 PROCEDURE — 4010F ACE/ARB THERAPY RXD/TAKEN: CPT | Performed by: STUDENT IN AN ORGANIZED HEALTH CARE EDUCATION/TRAINING PROGRAM

## 2024-02-20 PROCEDURE — 1126F AMNT PAIN NOTED NONE PRSNT: CPT | Performed by: STUDENT IN AN ORGANIZED HEALTH CARE EDUCATION/TRAINING PROGRAM

## 2024-02-20 PROCEDURE — 93005 ELECTROCARDIOGRAM TRACING: CPT | Performed by: STUDENT IN AN ORGANIZED HEALTH CARE EDUCATION/TRAINING PROGRAM

## 2024-02-20 PROCEDURE — 1159F MED LIST DOCD IN RCRD: CPT | Performed by: STUDENT IN AN ORGANIZED HEALTH CARE EDUCATION/TRAINING PROGRAM

## 2024-02-20 NOTE — PROGRESS NOTES
"Chief Complaint:   No chief complaint on file.     History Of Present Illness:    Janay Hilliard is a 73 y.o. female returns for yearly appointment.  She denies chest pain shortness of breath palpitations.  She had 1 episode of hospitalization due to dizziness lightheadedness which was felt to be due to dehydration.  She had a recent echocardiogram showed normal ejection fraction at 70 to 75% moderate aortic valve stenosis with the most notable valvular finding.  She is compliant with her meds.  EKG shows sinus rhythm normal axis poor R wave progression.  Blood pressure is 129/64 heart rate is 76 saturation 97% on room air.  Weight is 101 pounds.     Last Recorded Vitals:  Vitals:    02/20/24 1130   BP: 129/64   Pulse: 76   SpO2: 97%   Weight: 45.8 kg (101 lb)   Height: 1.549 m (5' 1\")       Review of Systems  ROS      Allergies:  Penicillins and Sulfa (sulfonamide antibiotics)    Outpatient Medications:  Current Outpatient Medications   Medication Instructions    apixaban (ELIQUIS) 5 mg, oral, Every 12 hours    atorvastatin (LIPITOR) 20 mg, oral, Daily    cholecalciferol (Vitamin D-3) 25 MCG (1000 UT) tablet oral    diclofenac sodium 1 % kit apply sparingly tto affected  area wice taina as needed    fluticasone (Flonase) 50 mcg/actuation nasal spray 1 spray, nasal    insulin aspart (NovoLOG) 100 unit/mL (3 mL) pen subcutaneous, 3 times daily with meals, 9 units 5 units 9 units w meals    lisinopril 5 mg, oral, 2 times daily    omeprazole (PRILOSEC) 20 mg, oral, Daily before breakfast    pen needle, diabetic 32 gauge x 5/32\" needle miscellaneous, Daily, Use four pen needles every day    Restasis 0.05 % ophthalmic emulsion INSTILL 1 DROP IN BOTH EYES TWICE DAILY    Tresiba FlexTouch U-100 18 Units, subcutaneous, Every morning       Physical Exam:  General: No acute distress,  A&O x3  Skin: Warm and dry  Neck: JVD is not elevated  ENT: Moist mucous membranes no lesions appreciated  Pulmonary: CTAB  Cards: Regular rate " rhythm, 3 out of 6 crescendo decrescendo mid-to-late peaking murmur gallops or rubs appreciated normal S1-S2  Abdomen: Soft nontender nondistended  Extremities: No edema or cyanosis  Psych: Appropriate mood and affect        Last Labs:  CBC -  Lab Results   Component Value Date    WBC 5.2 12/19/2023    HGB 12.3 12/19/2023    HCT 38.7 12/19/2023    MCV 94 12/19/2023     12/19/2023       CMP -  Lab Results   Component Value Date    CALCIUM 9.5 01/04/2024    PHOS 3.9 01/04/2024    PROT 7.3 12/19/2023    ALBUMIN 4.2 01/04/2024    AST 29 12/19/2023    ALT 17 12/19/2023    ALKPHOS 67 12/19/2023    BILITOT 0.5 12/19/2023       LIPID PANEL -   Lab Results   Component Value Date    CHOL 165 12/19/2023    TRIG 146 12/19/2023    HDL 47.0 12/19/2023    CHHDL 3.5 12/19/2023    LDLF 81 09/21/2023    VLDL 29 12/19/2023    NHDL 118 12/19/2023       RENAL FUNCTION PANEL -   Lab Results   Component Value Date    GLUCOSE 220 (H) 01/04/2024     01/04/2024    K 4.2 01/04/2024     01/04/2024    CO2 26 01/04/2024    ANIONGAP 12 01/04/2024    BUN 24 (H) 01/04/2024    CREATININE 1.32 (H) 01/04/2024    CALCIUM 9.5 01/04/2024    PHOS 3.9 01/04/2024    ALBUMIN 4.2 01/04/2024        Lab Results   Component Value Date    HGBA1C 8.2 (H) 12/19/2023       Last Cardiology Tests:  ECG:  No results found for this or any previous visit (from the past 4464 hour(s)).     Echo:  No results found for this or any previous visit from the past 1095 days.       Ejection Fractions:  EF   Date/Time Value Ref Range Status   01/04/2024 02:10 PM 79       Assessment and Plan    1. Moderate aortic valve stenosis: Remains in the moderate range by most recent assessment.  Asymptomatic. Physical exam consistent with moderate aortic valve stenosis. We will continue surveillance for now. Repeat echocardiogram 1 year for assessment.     2. Hyperlipidemia: Well-controlled with cholesterol therapy     3. Use of anticoagulation: Patient's previously had CVA  events with no reports of atrial fibrillation coagulopathies or DVTs. She is on Eliquis for unknown reasons. She is to discuss with her neurologist whether or not this is truly indicated. If not there is no active cardiovascular reason from our standpoint to continue Eliquis.     4.  Hypertension: Blood pressures are controlled with current medication.    (This note was generated with voice recognition software and may contain errors including spelling, grammar, syntax and missed recognition of what was dictated, of which may not have been fully corrected)     Herbert Pretty MD PhD

## 2024-03-07 ENCOUNTER — PATIENT OUTREACH (OUTPATIENT)
Dept: PRIMARY CARE | Facility: CLINIC | Age: 74
End: 2024-03-07
Payer: MEDICARE

## 2024-03-07 NOTE — PROGRESS NOTES
Unable to reach patient for two month post discharge follow up call.   M with call back number for patient to call if needed   If no voicemail available call attempts x 2 were made to contact the patient to assist with any questions or concerns patient may have.

## 2024-03-12 ENCOUNTER — OFFICE VISIT (OUTPATIENT)
Dept: PODIATRY | Facility: CLINIC | Age: 74
End: 2024-03-12
Payer: MEDICARE

## 2024-03-12 VITALS — SYSTOLIC BLOOD PRESSURE: 80 MMHG | TEMPERATURE: 97.8 F | HEART RATE: 102 BPM | DIASTOLIC BLOOD PRESSURE: 50 MMHG

## 2024-03-12 DIAGNOSIS — E11.42 DIABETIC POLYNEUROPATHY ASSOCIATED WITH TYPE 2 DIABETES MELLITUS (MULTI): ICD-10-CM

## 2024-03-12 DIAGNOSIS — B35.1 TINEA UNGUIUM: Primary | ICD-10-CM

## 2024-03-12 DIAGNOSIS — M79.674 TOE PAIN, RIGHT: ICD-10-CM

## 2024-03-12 DIAGNOSIS — M79.675 TOE PAIN, LEFT: ICD-10-CM

## 2024-03-12 PROCEDURE — 4010F ACE/ARB THERAPY RXD/TAKEN: CPT | Performed by: PODIATRIST

## 2024-03-12 PROCEDURE — 3078F DIAST BP <80 MM HG: CPT | Performed by: PODIATRIST

## 2024-03-12 PROCEDURE — 11721 DEBRIDE NAIL 6 OR MORE: CPT | Performed by: PODIATRIST

## 2024-03-12 PROCEDURE — 1036F TOBACCO NON-USER: CPT | Performed by: PODIATRIST

## 2024-03-12 PROCEDURE — 1126F AMNT PAIN NOTED NONE PRSNT: CPT | Performed by: PODIATRIST

## 2024-03-12 PROCEDURE — 3062F POS MACROALBUMINURIA REV: CPT | Performed by: PODIATRIST

## 2024-03-12 PROCEDURE — 1159F MED LIST DOCD IN RCRD: CPT | Performed by: PODIATRIST

## 2024-03-12 PROCEDURE — 3074F SYST BP LT 130 MM HG: CPT | Performed by: PODIATRIST

## 2024-03-12 PROCEDURE — 1160F RVW MEDS BY RX/DR IN RCRD: CPT | Performed by: PODIATRIST

## 2024-03-12 NOTE — PROGRESS NOTES
"CC: painful thickened and elongated toenails and diabetic care.     HPI: Pt presents for dm foot exam and painful thickened and elongated toenails that are difficult to manage.  Onset was gradual with worsening course until recently.  Aggravated by shoe gear and ambulation.       PCP: Dr. Nieto  Last visit: 1-17-24     PMH  Past Medical History:   Diagnosis Date    Personal history of other diseases of the nervous system and sense organs 05/05/2021    History of hearing loss    Type 2 diabetes mellitus with diabetic chronic kidney disease (CMS/HCC) 03/26/2022    CKD stage 3 secondary to diabetes    Type 2 diabetes mellitus with diabetic chronic kidney disease (CMS/[facility]) 04/13/2022    CKD stage 3 secondary to diabetes     MEDS    Current Outpatient Medications:     apixaban (Eliquis) 5 mg tablet, Take 1 tablet (5 mg) by mouth every 12 hours., Disp: 180 tablet, Rfl: 0    atorvastatin (Lipitor) 20 mg tablet, Take 1 tablet (20 mg) by mouth once daily., Disp: 90 tablet, Rfl: 0    cholecalciferol (Vitamin D-3) 25 MCG (1000 UT) tablet, Take by mouth., Disp: , Rfl:     diclofenac sodium 1 % kit, apply sparingly tto affected  area wice daiy as needed, Disp: , Rfl:     fluticasone (Flonase) 50 mcg/actuation nasal spray, Administer 1 spray into affected nostril(s)., Disp: , Rfl:     insulin aspart (NovoLOG) 100 unit/mL (3 mL) pen, Inject under the skin 3 times a day with meals. 9 units 5 units 9 units w meals, Disp: , Rfl:     lisinopril 5 mg tablet, Take 1 tablet (5 mg) by mouth 2 times a day. (Patient taking differently: Take 1 tablet (5 mg) by mouth once daily.), Disp: 180 tablet, Rfl: 1    omeprazole (PriLOSEC) 20 mg DR capsule, Take 1 capsule (20 mg) by mouth once daily in the morning. Take before meals., Disp: 90 capsule, Rfl: 0    pen needle, diabetic 32 gauge x 5/32\" needle, once daily. Use four pen needles every day, Disp: , Rfl:     Restasis 0.05 % ophthalmic emulsion, INSTILL 1 DROP IN BOTH EYES TWICE DAILY, Disp: , " Rfl:     Tresiba FlexTouch U-100 100 unit/mL (3 mL) injection, Inject 18 Units under the skin once daily in the morning., Disp: 3 mL, Rfl: 0  Allergies  Allergies   Allergen Reactions    Penicillins Cough     Happened when patient was a child    Sulfa (Sulfonamide Antibiotics) Rash     Social History     Socioeconomic History    Marital status:      Spouse name: Not on file    Number of children: Not on file    Years of education: Not on file    Highest education level: Not on file   Occupational History    Not on file   Tobacco Use    Smoking status: Never    Smokeless tobacco: Never   Vaping Use    Vaping Use: Never used   Substance and Sexual Activity    Alcohol use: Never    Drug use: Never    Sexual activity: Defer   Other Topics Concern    Not on file   Social History Narrative    Not on file     Social Determinants of Health     Financial Resource Strain: Not on file   Food Insecurity: Not on file   Transportation Needs: Not on file   Physical Activity: Not on file   Stress: Not on file   Social Connections: Not on file   Intimate Partner Violence: Not on file   Housing Stability: Not on file     Family History   Problem Relation Name Age of Onset    Diabetes Father      Diabetes Paternal Grandmother       Past Surgical History:   Procedure Laterality Date    OTHER SURGICAL HISTORY  2021    Tonsillectomy    OTHER SURGICAL HISTORY  2021     section    OTHER SURGICAL HISTORY  2021    Appendectomy       REVIEW OF SYSTEMS  DERM:   + as noted in HPI.       Physical examination:   On General Observation: Patient is a pleasant, cooperative, well developed 73 y.o. diabetic   adult female. The patient is alert and oriented to time, place and person. Patient has normal affect and mood.  There were no vitals taken for this visit.    Vascular:  DP and PT pulses are 2/4 b/l.  mild edema noted. mild varicosities b/l.  CFT  6 seconds to all digits bilateral.  Skin temperature is warm to warm  from proximal to distal bilateral.      Muscular: Strength is 5/5 for all instrinsic and extrinsic muscle groups.     Neuro:  Proprioception decreased.   Sensation to vibration is  decreased. Protective sensation decreased  at all pedal sites via [place] 5.07 monofilament bilateral.  Light touch present bilateral.     Derm:    Left toenails: 1-5 Brittleness, crumbling upon debridement, subungual debris, elongation, mycotic appearance, tenderness, and thickness.   Right toenails: 1-5 Brittleness, crumbling upon debridement, subungual debris, elongation, mycotic appearance, tenderness, and thickness.   Hair growth is decreased b/l le    ASSESSMENT:    Tinea Unguium [B35.1]   E11.42  Pain in right toe(s) [M79.674]   Pain in left toe(s) [M79.675]       PLAN:   DM foot care and DM foot manifestations were reviewed.  The patient was educated on clinical findings, diagnosis and treatment plans. Patient understands all that has been explained and all questions were answered to apparent satisfaction.     - Debrided toenails 1-10 in length and height.   - Follow up in 9-12 weeks.       Haim Best DPM

## 2024-04-08 DIAGNOSIS — E11.3553 TYPE 2 DIABETES MELLITUS WITH STABLE PROLIFERATIVE RETINOPATHY OF BOTH EYES, WITH LONG-TERM CURRENT USE OF INSULIN (MULTI): ICD-10-CM

## 2024-04-08 DIAGNOSIS — Z79.4 TYPE 2 DIABETES MELLITUS WITH STABLE PROLIFERATIVE RETINOPATHY OF BOTH EYES, WITH LONG-TERM CURRENT USE OF INSULIN (MULTI): ICD-10-CM

## 2024-04-08 DIAGNOSIS — E78.5 HYPERLIPIDEMIA, UNSPECIFIED HYPERLIPIDEMIA TYPE: ICD-10-CM

## 2024-04-08 DIAGNOSIS — I10 BENIGN ESSENTIAL HTN: ICD-10-CM

## 2024-04-12 DIAGNOSIS — I10 BENIGN ESSENTIAL HTN: ICD-10-CM

## 2024-04-12 RX ORDER — LISINOPRIL 5 MG/1
5 TABLET ORAL 2 TIMES DAILY
Qty: 180 TABLET | Refills: 0 | Status: SHIPPED | OUTPATIENT
Start: 2024-04-12

## 2024-04-12 NOTE — TELEPHONE ENCOUNTER
Pt ldm requesting refill for   Lisinopril 5 mg   DM [place]   Completely out   Next appt: 4/24/24  Last appt: 1/17/24

## 2024-04-19 ENCOUNTER — LAB (OUTPATIENT)
Dept: LAB | Facility: LAB | Age: 74
End: 2024-04-19
Payer: MEDICARE

## 2024-04-19 DIAGNOSIS — Z79.4 TYPE 2 DIABETES MELLITUS WITH STABLE PROLIFERATIVE RETINOPATHY OF BOTH EYES, WITH LONG-TERM CURRENT USE OF INSULIN (MULTI): ICD-10-CM

## 2024-04-19 DIAGNOSIS — I10 BENIGN ESSENTIAL HTN: ICD-10-CM

## 2024-04-19 DIAGNOSIS — E78.5 HYPERLIPIDEMIA, UNSPECIFIED HYPERLIPIDEMIA TYPE: ICD-10-CM

## 2024-04-19 DIAGNOSIS — E11.3553 TYPE 2 DIABETES MELLITUS WITH STABLE PROLIFERATIVE RETINOPATHY OF BOTH EYES, WITH LONG-TERM CURRENT USE OF INSULIN (MULTI): ICD-10-CM

## 2024-04-19 LAB
ALBUMIN SERPL BCP-MCNC: 3.8 G/DL (ref 3.4–5)
ALP SERPL-CCNC: 68 U/L (ref 33–136)
ALT SERPL W P-5'-P-CCNC: 16 U/L (ref 7–45)
ANION GAP SERPL CALC-SCNC: 12 MMOL/L (ref 10–20)
AST SERPL W P-5'-P-CCNC: 29 U/L (ref 9–39)
BASOPHILS # BLD AUTO: 0.03 X10*3/UL (ref 0–0.1)
BASOPHILS NFR BLD AUTO: 0.5 %
BILIRUB SERPL-MCNC: 0.6 MG/DL (ref 0–1.2)
BUN SERPL-MCNC: 36 MG/DL (ref 6–23)
CALCIUM SERPL-MCNC: 9 MG/DL (ref 8.6–10.6)
CHLORIDE SERPL-SCNC: 106 MMOL/L (ref 98–107)
CHOLEST SERPL-MCNC: 162 MG/DL (ref 0–199)
CHOLESTEROL/HDL RATIO: 4.1
CO2 SERPL-SCNC: 28 MMOL/L (ref 21–32)
CREAT SERPL-MCNC: 2.53 MG/DL (ref 0.5–1.05)
EGFRCR SERPLBLD CKD-EPI 2021: 19 ML/MIN/1.73M*2
EOSINOPHIL # BLD AUTO: 0.16 X10*3/UL (ref 0–0.4)
EOSINOPHIL NFR BLD AUTO: 2.9 %
ERYTHROCYTE [DISTWIDTH] IN BLOOD BY AUTOMATED COUNT: 13.5 % (ref 11.5–14.5)
EST. AVERAGE GLUCOSE BLD GHB EST-MCNC: 171 MG/DL
GLUCOSE SERPL-MCNC: 124 MG/DL (ref 74–99)
HBA1C MFR BLD: 7.6 %
HCT VFR BLD AUTO: 40.9 % (ref 36–46)
HDLC SERPL-MCNC: 39.4 MG/DL
HGB BLD-MCNC: 13.4 G/DL (ref 12–16)
IMM GRANULOCYTES # BLD AUTO: 0.01 X10*3/UL (ref 0–0.5)
IMM GRANULOCYTES NFR BLD AUTO: 0.2 % (ref 0–0.9)
LDLC SERPL CALC-MCNC: 92 MG/DL
LYMPHOCYTES # BLD AUTO: 1.65 X10*3/UL (ref 0.8–3)
LYMPHOCYTES NFR BLD AUTO: 29.9 %
MCH RBC QN AUTO: 30.3 PG (ref 26–34)
MCHC RBC AUTO-ENTMCNC: 32.8 G/DL (ref 32–36)
MCV RBC AUTO: 93 FL (ref 80–100)
MONOCYTES # BLD AUTO: 0.42 X10*3/UL (ref 0.05–0.8)
MONOCYTES NFR BLD AUTO: 7.6 %
NEUTROPHILS # BLD AUTO: 3.24 X10*3/UL (ref 1.6–5.5)
NEUTROPHILS NFR BLD AUTO: 58.9 %
NON HDL CHOLESTEROL: 123 MG/DL (ref 0–149)
NRBC BLD-RTO: 0 /100 WBCS (ref 0–0)
PLATELET # BLD AUTO: 283 X10*3/UL (ref 150–450)
POTASSIUM SERPL-SCNC: 4.4 MMOL/L (ref 3.5–5.3)
PROT SERPL-MCNC: 7 G/DL (ref 6.4–8.2)
RBC # BLD AUTO: 4.42 X10*6/UL (ref 4–5.2)
SODIUM SERPL-SCNC: 142 MMOL/L (ref 136–145)
TRIGL SERPL-MCNC: 153 MG/DL (ref 0–149)
VLDL: 31 MG/DL (ref 0–40)
WBC # BLD AUTO: 5.5 X10*3/UL (ref 4.4–11.3)

## 2024-04-19 PROCEDURE — 36415 COLL VENOUS BLD VENIPUNCTURE: CPT

## 2024-04-19 PROCEDURE — 85025 COMPLETE CBC W/AUTO DIFF WBC: CPT

## 2024-04-19 PROCEDURE — 83036 HEMOGLOBIN GLYCOSYLATED A1C: CPT

## 2024-04-19 PROCEDURE — 80061 LIPID PANEL: CPT

## 2024-04-19 PROCEDURE — 80053 COMPREHEN METABOLIC PANEL: CPT

## 2024-04-24 ENCOUNTER — OFFICE VISIT (OUTPATIENT)
Dept: PRIMARY CARE | Facility: CLINIC | Age: 74
End: 2024-04-24
Payer: MEDICARE

## 2024-04-24 ENCOUNTER — HOSPITAL ENCOUNTER (OUTPATIENT)
Dept: RADIOLOGY | Facility: CLINIC | Age: 74
Discharge: HOME | End: 2024-04-24
Payer: MEDICARE

## 2024-04-24 VITALS
HEART RATE: 99 BPM | TEMPERATURE: 98 F | DIASTOLIC BLOOD PRESSURE: 92 MMHG | BODY MASS INDEX: 18.5 KG/M2 | HEIGHT: 61 IN | WEIGHT: 98 LBS | SYSTOLIC BLOOD PRESSURE: 144 MMHG | OXYGEN SATURATION: 99 %

## 2024-04-24 DIAGNOSIS — E78.5 DYSLIPIDEMIA: ICD-10-CM

## 2024-04-24 DIAGNOSIS — I10 BENIGN ESSENTIAL HTN: Primary | ICD-10-CM

## 2024-04-24 DIAGNOSIS — Z79.4 TYPE 2 DIABETES MELLITUS WITH STABLE PROLIFERATIVE RETINOPATHY OF BOTH EYES, WITH LONG-TERM CURRENT USE OF INSULIN (MULTI): ICD-10-CM

## 2024-04-24 DIAGNOSIS — E11.3299 DIABETES MELLITUS WITH BACKGROUND RETINOPATHY (MULTI): ICD-10-CM

## 2024-04-24 DIAGNOSIS — E11.3553 TYPE 2 DIABETES MELLITUS WITH STABLE PROLIFERATIVE RETINOPATHY OF BOTH EYES, WITH LONG-TERM CURRENT USE OF INSULIN (MULTI): ICD-10-CM

## 2024-04-24 DIAGNOSIS — J01.00 ACUTE NON-RECURRENT MAXILLARY SINUSITIS: ICD-10-CM

## 2024-04-24 DIAGNOSIS — R05.1 ACUTE COUGH: ICD-10-CM

## 2024-04-24 PROCEDURE — 1160F RVW MEDS BY RX/DR IN RCRD: CPT | Performed by: INTERNAL MEDICINE

## 2024-04-24 PROCEDURE — 3077F SYST BP >= 140 MM HG: CPT | Performed by: INTERNAL MEDICINE

## 2024-04-24 PROCEDURE — 71046 X-RAY EXAM CHEST 2 VIEWS: CPT | Performed by: RADIOLOGY

## 2024-04-24 PROCEDURE — 3051F HG A1C>EQUAL 7.0%<8.0%: CPT | Performed by: INTERNAL MEDICINE

## 2024-04-24 PROCEDURE — 1159F MED LIST DOCD IN RCRD: CPT | Performed by: INTERNAL MEDICINE

## 2024-04-24 PROCEDURE — 4010F ACE/ARB THERAPY RXD/TAKEN: CPT | Performed by: INTERNAL MEDICINE

## 2024-04-24 PROCEDURE — 71046 X-RAY EXAM CHEST 2 VIEWS: CPT

## 2024-04-24 PROCEDURE — 99214 OFFICE O/P EST MOD 30 MIN: CPT | Performed by: INTERNAL MEDICINE

## 2024-04-24 PROCEDURE — 3080F DIAST BP >= 90 MM HG: CPT | Performed by: INTERNAL MEDICINE

## 2024-04-24 PROCEDURE — 3062F POS MACROALBUMINURIA REV: CPT | Performed by: INTERNAL MEDICINE

## 2024-04-24 PROCEDURE — 3048F LDL-C <100 MG/DL: CPT | Performed by: INTERNAL MEDICINE

## 2024-04-24 RX ORDER — DOXYCYCLINE 100 MG/1
100 CAPSULE ORAL 2 TIMES DAILY
Qty: 20 CAPSULE | Refills: 0 | Status: SHIPPED | OUTPATIENT
Start: 2024-04-24 | End: 2024-05-04

## 2024-04-24 ASSESSMENT — PATIENT HEALTH QUESTIONNAIRE - PHQ9
1. LITTLE INTEREST OR PLEASURE IN DOING THINGS: NOT AT ALL
SUM OF ALL RESPONSES TO PHQ9 QUESTIONS 1 AND 2: 0
2. FEELING DOWN, DEPRESSED OR HOPELESS: NOT AT ALL

## 2024-04-24 ASSESSMENT — ENCOUNTER SYMPTOMS
DEPRESSION: 0
OCCASIONAL FEELINGS OF UNSTEADINESS: 0
LOSS OF SENSATION IN FEET: 0

## 2024-04-24 NOTE — PROGRESS NOTES
"Subjective   Patient ID: Janay Hilliard is a 73 y.o. female who presents for Follow-up (EP.  Follow up.  Labs done.  Has been sick for weeks.  Runny nose, cough.  /Has been having low blood pressure readings at times last week and stopped taking bp medication.).    HPI    Had some blood  pressures last week 90/40 and had sxs  w that was dizzy   Sxs are better    Right not better  at 140 but was on cold meds and wa taking cold med w phenylephrine   Was concerned bc in 11/2023 and was pneumonia   4/1/2023 had a cold and then sxs got better after a week and then  it came back    Last pneumonia shot was  3 yrs ago  Current sxs has runny nose and clear  dc   Has a cough on occ  post nasal drip and clear discharge  has a tickle cough and kept her up     Here for reg fu appt  She has been compliant with her medications as listed.  For her diabetes she sees endocrinology.  She has had some issues with elevated sugars.  They have already been there and addressed this with her Endo doctor.  She continues to see her renal doctor she has not been there in some time.  She has had some worsening of her renal function.  Her and her daughter disagree on how much water intake she has but it seems it is probably fair.  She avoids anti-inflammatories.  She was recently taken some cold medicine that she bought over-the-counter.    Patient continues on her regular cardiology follow-ups.  She has no chest pains headaches dizziness lightheadedness or shortness of breath currently.  She is otherwise been feeling well     Review of Systems  Review of systems was performed and is otherwise negative except as noted in HPI.  Objective   BP (!) 144/92   Pulse 99   Temp 36.7 °C (98 °F) (Oral)   Ht 1.549 m (5' 1\")   Wt (!) 44.5 kg (98 lb)   SpO2 99%   BMI 18.52 kg/m²    Physical Exam  HEENT is remarkable for some cloudiness in her bilateral eyes, she wears hearing aids, canals were normal, TMs were normal, nares and oropharynx within normal " limits  Lungs clear bilaterally  Heart is regular rate rhythm no murmurs  Abdomen benign  Lower extremities no edema  Patient has some kyphosis her extremities are somewhat frail  Assessment/Plan   Diagnoses and all orders for this visit:  Benign essential HTN  -     Hemoglobin A1C; Future  -     Lipid Panel; Future  -     Comprehensive Metabolic Panel; Future  Diabetes mellitus with background retinopathy (Multi)  -     Hemoglobin A1C; Future  -     Lipid Panel; Future  -     Comprehensive Metabolic Panel; Future  Dyslipidemia  -     Hemoglobin A1C; Future  -     Lipid Panel; Future  -     Comprehensive Metabolic Panel; Future  Type 2 diabetes mellitus with stable proliferative retinopathy of both eyes, with long-term current use of insulin (Multi)  -     Hemoglobin A1C; Future  Acute non-recurrent maxillary sinusitis  -     doxycycline (Monodox) 100 mg capsule; Take 1 capsule (100 mg) by mouth 2 times a day for 10 days. Take with at least 8 ounces (large glass) of water, do not lie down for 30 minutes after  Acute cough  -     XR chest 2 views; Future    Call with issues  Sinusitis will be treated  Blood work ordered for next appointment  She does not know what refill she needs  Chest x-ray ordered due to recurrent cough    Taylor Nieto MD

## 2024-05-20 PROBLEM — E11.3553 TYPE 2 DIABETES MELLITUS WITH STABLE PROLIFERATIVE RETINOPATHY OF BOTH EYES, WITH LONG-TERM CURRENT USE OF INSULIN (MULTI): Status: ACTIVE | Noted: 2023-01-23

## 2024-05-20 PROBLEM — M79.675 PAIN OF TOE OF LEFT FOOT: Status: RESOLVED | Noted: 2024-01-16 | Resolved: 2024-05-20

## 2024-05-20 PROBLEM — Z79.4 TYPE 2 DIABETES MELLITUS WITH STABLE PROLIFERATIVE RETINOPATHY OF BOTH EYES, WITH LONG-TERM CURRENT USE OF INSULIN (MULTI): Status: ACTIVE | Noted: 2023-01-23

## 2024-05-20 PROBLEM — M79.674 PAIN OF TOE OF RIGHT FOOT: Status: RESOLVED | Noted: 2024-01-16 | Resolved: 2024-05-20

## 2024-05-20 RX ORDER — INSULIN GLARGINE 100 [IU]/ML
10 INJECTION, SOLUTION SUBCUTANEOUS EVERY MORNING
COMMUNITY
Start: 2024-05-09

## 2024-05-20 RX ORDER — GLUCAGON INJECTION, SOLUTION 1 MG/.2ML
1 INJECTION, SOLUTION SUBCUTANEOUS
COMMUNITY
Start: 2024-03-25

## 2024-05-20 RX ORDER — GLUCAGON 3 MG/1
POWDER NASAL
COMMUNITY
Start: 2024-03-21

## 2024-05-20 RX ORDER — INSULIN LISPRO 100 [IU]/ML
INJECTION, SOLUTION INTRAVENOUS; SUBCUTANEOUS
COMMUNITY
Start: 2024-05-09

## 2024-05-21 ENCOUNTER — TELEPHONE (OUTPATIENT)
Dept: PRIMARY CARE | Facility: CLINIC | Age: 74
End: 2024-05-21
Payer: MEDICARE

## 2024-05-21 NOTE — TELEPHONE ENCOUNTER
Freddy called from [facility] requesting a verbal for orders for home skilled nursing, PT and OT.  Pt is being discharge from [facility] on 05/23/24    Ok for verbal?

## 2024-05-22 ENCOUNTER — APPOINTMENT (OUTPATIENT)
Dept: PRIMARY CARE | Facility: CLINIC | Age: 74
End: 2024-05-22
Payer: MEDICARE

## 2024-05-22 ENCOUNTER — APPOINTMENT (OUTPATIENT)
Dept: RHEUMATOLOGY | Facility: CLINIC | Age: 74
End: 2024-05-22
Payer: MEDICARE

## 2024-05-22 ENCOUNTER — OFFICE VISIT (OUTPATIENT)
Dept: RHEUMATOLOGY | Facility: CLINIC | Age: 74
End: 2024-05-22
Payer: MEDICARE

## 2024-05-22 VITALS
HEIGHT: 61 IN | WEIGHT: 97.4 LBS | SYSTOLIC BLOOD PRESSURE: 121 MMHG | TEMPERATURE: 97.3 F | HEART RATE: 87 BPM | BODY MASS INDEX: 18.39 KG/M2 | DIASTOLIC BLOOD PRESSURE: 52 MMHG | OXYGEN SATURATION: 99 %

## 2024-05-22 DIAGNOSIS — N25.81 SECONDARY HYPERPARATHYROIDISM OF RENAL ORIGIN (MULTI): ICD-10-CM

## 2024-05-22 DIAGNOSIS — N18.4 CHRONIC RENAL DISEASE, STAGE IV (MULTI): ICD-10-CM

## 2024-05-22 DIAGNOSIS — M81.0 AGE-RELATED OSTEOPOROSIS WITHOUT CURRENT PATHOLOGICAL FRACTURE: ICD-10-CM

## 2024-05-22 DIAGNOSIS — M15.9 PRIMARY OSTEOARTHRITIS INVOLVING MULTIPLE JOINTS: Primary | ICD-10-CM

## 2024-05-22 PROBLEM — M15.0 PRIMARY OSTEOARTHRITIS INVOLVING MULTIPLE JOINTS: Status: ACTIVE | Noted: 2024-05-22

## 2024-05-22 PROBLEM — M19.90 ARTHRITIS: Status: RESOLVED | Noted: 2023-01-23 | Resolved: 2024-05-22

## 2024-05-22 PROCEDURE — 3048F LDL-C <100 MG/DL: CPT | Performed by: INTERNAL MEDICINE

## 2024-05-22 PROCEDURE — 4010F ACE/ARB THERAPY RXD/TAKEN: CPT | Performed by: INTERNAL MEDICINE

## 2024-05-22 PROCEDURE — 1036F TOBACCO NON-USER: CPT | Performed by: INTERNAL MEDICINE

## 2024-05-22 PROCEDURE — 3062F POS MACROALBUMINURIA REV: CPT | Performed by: INTERNAL MEDICINE

## 2024-05-22 PROCEDURE — 1158F ADVNC CARE PLAN TLK DOCD: CPT | Performed by: INTERNAL MEDICINE

## 2024-05-22 PROCEDURE — 1160F RVW MEDS BY RX/DR IN RCRD: CPT | Performed by: INTERNAL MEDICINE

## 2024-05-22 PROCEDURE — 3074F SYST BP LT 130 MM HG: CPT | Performed by: INTERNAL MEDICINE

## 2024-05-22 PROCEDURE — 3078F DIAST BP <80 MM HG: CPT | Performed by: INTERNAL MEDICINE

## 2024-05-22 PROCEDURE — 99204 OFFICE O/P NEW MOD 45 MIN: CPT | Performed by: INTERNAL MEDICINE

## 2024-05-22 PROCEDURE — 3051F HG A1C>EQUAL 7.0%<8.0%: CPT | Performed by: INTERNAL MEDICINE

## 2024-05-22 PROCEDURE — 1159F MED LIST DOCD IN RCRD: CPT | Performed by: INTERNAL MEDICINE

## 2024-05-22 PROCEDURE — 1123F ACP DISCUSS/DSCN MKR DOCD: CPT | Performed by: INTERNAL MEDICINE

## 2024-05-22 RX ORDER — DICLOFENAC SODIUM 10 MG/G
4 GEL TOPICAL 4 TIMES DAILY PRN
Qty: 450 G | Refills: 3 | Status: SHIPPED | OUTPATIENT
Start: 2024-05-22 | End: 2025-05-22

## 2024-05-22 RX ORDER — AMLODIPINE BESYLATE 10 MG/1
10 TABLET ORAL DAILY
COMMUNITY

## 2024-05-22 ASSESSMENT — ENCOUNTER SYMPTOMS
DYSURIA: 0
CONFUSION: 1
DIFFICULTY URINATING: 1
MYALGIAS: 0
PALPITATIONS: 0
CHILLS: 0
LIGHT-HEADEDNESS: 0
TREMORS: 0
NUMBNESS: 1
POLYDIPSIA: 0
HEMATURIA: 0
NECK STIFFNESS: 0
WHEEZING: 0
AGITATION: 0
EYE PAIN: 0
EYE ITCHING: 0
FATIGUE: 0
DYSPHORIC MOOD: 1
CONSTIPATION: 0
DIARRHEA: 0
PHOTOPHOBIA: 0
EYE DISCHARGE: 0
NECK PAIN: 0
RHINORRHEA: 1
BLOOD IN STOOL: 0
ADENOPATHY: 0
ROS GI COMMENTS: HEARTBURN
DIZZINESS: 0
TROUBLE SWALLOWING: 0
ABDOMINAL PAIN: 0
BACK PAIN: 1
COUGH: 0
SLEEP DISTURBANCE: 0
SORE THROAT: 0
ARTHRALGIAS: 0
WEAKNESS: 0
FREQUENCY: 0
BRUISES/BLEEDS EASILY: 1
NAUSEA: 0
COLOR CHANGE: 0
HEADACHES: 0
SHORTNESS OF BREATH: 1
JOINT SWELLING: 0
NERVOUS/ANXIOUS: 0
EYE REDNESS: 0
VOMITING: 0
FEVER: 0
UNEXPECTED WEIGHT CHANGE: 1

## 2024-05-22 ASSESSMENT — PATIENT HEALTH QUESTIONNAIRE - PHQ9
SUM OF ALL RESPONSES TO PHQ9 QUESTIONS 1 & 2: 0
1. LITTLE INTEREST OR PLEASURE IN DOING THINGS: NOT AT ALL
2. FEELING DOWN, DEPRESSED OR HOPELESS: NOT AT ALL

## 2024-05-22 NOTE — ASSESSMENT & PLAN NOTE
Pt and daughter state this is not the reason for appointment.  Chart reviewed.  Pt had CKD and hyperparathyroidism and hard to interpret bone density in this setting as it may be due to metabolic bone disease.   Also, given CKD, most medications are contraindicated.  Pt does see nephrology and endocrinology who can best determine whether she has metabolic bone disease causing this

## 2024-05-22 NOTE — ASSESSMENT & PLAN NOTE
Pt has CKD.  NSAIDs are contraindicated.   Pt has confusion and therefore, muscle relaxers and any controlled substance is contraindicated.   Pt reports relief with tylenol and advised to also try  voltaren gel which shouldn't affect CKD.

## 2024-05-22 NOTE — PATIENT INSTRUCTIONS
It was a pleasure to see you today  Please call if your symptoms worsen  Please review your summary for education and reminders.  Follow up at your next appointment.    If you had labs/xrays done today, you will be able to view on [employer].   We will contact you when the results are reviewed for further discussion.  Please note that you may receive your results before I have had a chance to review.  Please know I will be contacting you for discussion  Homegoing instructions for all patient  A healthy lifestyle helps chronic diseases  These are all the goals you should strive to improve your overall health   Blood pressure <130/85   BMI of <30 or waist circumference that is 1/2 of your height   Fasting blood sugar <107 (if you are diabetic, aim for an A1c <6.4%_   LDL cholesterol <130   Avoid smoking   Manage your stress   Get your preventive exams   Get your immunizations       Ways to Help Prevent Falls at Home    Quick Tips   ? Ask for help if you need it. Most people want to help!   ? Get up slowly after sitting or laying down   ? Wear a medical alert device or keep cell phone in your pocket   ? Use night lights, especially areas near a bathroom   ? Keep the items you use often within reach on a small stool or end table   ? Use an assistive device such as walker or cane, as directed by provider/physical therapy   ? Use a non-slip mat and grab bars in your bathroom. Look for home health sections for best options     Other Areas to Focus On   ? Exercise and nutrition: Regular exercise or taking a falls prevention class are great ways improve strength and balance. Don’t forget to stay hydrated and bring a snack!   ? Medicine side effects: Some medicines can make you sleepy or dizzy, which could cause a fall. Ask your healthcare provider about the side effects your medicines could cause. Be sure to let them know if you take any vitamins or supplements as well.   ? Tripping hazards: Remove items you could trip on, such  as loose mats, rugs, cords, and clutter. Wear closed toe shoes with rubber soles.   ? Health and wellness: Get regular checkups with your healthcare provider, plus routine vision and hearing screenings. Talk with your healthcare provider about:   o Your medicines and the possible side effects - bring them in a bag if that is easier!   o Problems with balance or feeling dizzy   o Ways to promote bone health, such as Vitamin D and calcium supplements   o Questions or concerns about falling     *Ask your healthcare team if you have questions     ©[facility], 2022

## 2024-05-22 NOTE — LETTER
May 22, 2024     Taylor Nieto MD  [address]  [facility]  [facility] [address]    Patient: Janay Hilliard   YOB: 1950   Date of Visit: 5/22/2024       Dear Dr. Taylor Nieto MD:    Thank you for referring Janay Hilliard to me for evaluation. Below are my notes for this consultation.  If you have questions, please do not hesitate to call me. I look forward to following your patient along with you.       Sincerely,     Zoila Bonilla MD      CC: No Recipients  ______________________________________________________________________________________    RHEUMATOLOGY CONSULTATION NOTE  PCP:  [facility]    See also scanned history form for details  Janay Hilliard 73 y.o. female  Chief Complaint   Patient presents with   • Establish Care   • Arthritis       SUBJECTIVE  Pt presents for evaluation of arthritis.  Pt is currently in a SNF unit recovering from TITA/confusion/uncontrolled diabetes.  Is getting PT at SNF unit and thinks PT will be arranged at home on discharge tomorrow (daughter needs to confirm with the facility).    Pt reports appointment made for back and hip pain.   Daughter reports pt has difficulty with mobility  Pt states she hasn't had pain in her back for 6 months (daughter states pt does complain of pain)    Of note, on schedule--reason for referral was osteoporosis.   Pt and daughter didn't think that was the reason for the referral  Pt has fallen but has not had fractures      Records since last seen reviewed in [facility]  Patient Active Problem List    Diagnosis Date Noted   • Chronic renal disease, stage IV (Multi) 05/22/2024   • Primary osteoarthritis involving multiple joints 05/22/2024   • Onychomycosis due to dermatophyte 01/16/2024   • Tinnitus of both ears 01/16/2024   • Positional lightheadedness 01/09/2024   • Unsteady gait 01/09/2024   • Protein-calorie malnutrition, unspecified severity (Multi) 06/16/2023   • Peripheral  vascular disease, unspecified (CMS-HCC) 06/16/2023   • Secondary hyperparathyroidism of renal origin (Multi) 06/16/2023   • Benign essential HTN 01/23/2023   • COPD (chronic obstructive pulmonary disease) (Multi) 01/23/2023   • Chronic kidney disease, stage 3b (Multi) 01/23/2023   • Diabetic retinopathy (Multi) 01/23/2023   • Diverticulosis 01/23/2023   • GERD (gastroesophageal reflux disease) 01/23/2023   • Hyperthyroidism 01/23/2023   • Liver disease, chronic 01/23/2023   • Osteoporosis 01/23/2023   • Stroke (Multi) 01/23/2023   • Visual loss 01/23/2023   • Vitamin D deficiency 01/23/2023   • Aortic valve disease 01/23/2023   • Bilateral sensorineural hearing loss 01/23/2023   • Type 2 diabetes mellitus with stable proliferative retinopathy of both eyes, with long-term current use of insulin (Multi) 01/23/2023     Past Medical History:   Diagnosis Date   • Cataract    • Consumes two servings of caffeine per day    • Pain of toe of left foot 01/16/2024   • Pain of toe of right foot 01/16/2024     Current Outpatient Medications   Medication Instructions   • amLODIPine (NORVASC) 10 mg, oral, Daily   • apixaban (ELIQUIS) 5 mg, oral, Every 12 hours   • atorvastatin (LIPITOR) 20 mg, oral, Daily   • Baqsimi 3 mg/actuation spray,non-aerosol Use 1 Spray in the nose as needed. May repeat after 15 minutes using a new device if there is no response.   • cholecalciferol (Vitamin D-3) 25 MCG (1000 UT) tablet oral   • diclofenac sodium (VOLTAREN) 4 g, Topical, 4 times daily PRN   • diclofenac sodium 1 % kit apply sparingly tto affected  area manasa her as needed   • fluticasone (Flonase) 50 mcg/actuation nasal spray 1 spray, nasal   • Gvoke HypoPen 2-Pack 1 mg, subcutaneous   • insulin aspart (NovoLOG) 100 unit/mL (3 mL) pen subcutaneous, 3 times daily (morning, midday, late afternoon), 9 units 5 units 9 units w meals   • insulin glargine (LANTUS) 10 Units, subcutaneous, Every morning   • insulin lispro (HumaLOG KwikPen Insulin)  "100 unit/mL injection Previously on 10 units at breakfast, 5-6 Units at lunch, 9 units at supper plus scale up to 30 units daily  During hospitalization : was on 3u qAC with SSI.  Will need continued monitoring and adjustment.   • lisinopril 5 mg, oral, 2 times daily   • omeprazole (PRILOSEC) 20 mg, oral, Daily before breakfast   • pen needle, diabetic 32 gauge x \" needle miscellaneous, Daily, Use four pen needles every day   • Restasis 0.05 % ophthalmic emulsion INSTILL 1 DROP IN BOTH EYES TWICE DAILY   • Tresiba FlexTouch U-100 18 Units, subcutaneous, Every morning     Allergies   Allergen Reactions   • Penicillins Cough     Happened when patient was a child   • Sulfa (Sulfonamide Antibiotics) Rash     Social History     Tobacco Use   • Smoking status: Former     Current packs/day: 0.00     Types: Cigarettes     Quit date:      Years since quittin.4   • Smokeless tobacco: Never   Vaping Use   • Vaping status: Never Used   Substance Use Topics   • Alcohol use: Never   • Drug use: Never     Family History   Problem Relation Name Age of Onset   • Alcohol abuse Mother     • Hypertension Father     • Arthritis Father     • Diabetes Father     • Gout Father     • Arthritis Sister     • Hypertension Daughter     • Diabetes Paternal Grandmother           Review of Systems   Constitutional:  Positive for unexpected weight change (10lb weight loss). Negative for chills, fatigue and fever.   HENT:  Positive for hearing loss, rhinorrhea and tinnitus. Negative for congestion, dental problem, mouth sores, nosebleeds, postnasal drip, sore throat and trouble swallowing.         No dry mouth   Eyes:  Positive for visual disturbance. Negative for photophobia, pain, discharge, redness and itching.         dry eye   Respiratory:  Positive for shortness of breath. Negative for cough and wheezing.    Cardiovascular:  Negative for chest pain, palpitations and leg swelling.   Gastrointestinal:  Negative for abdominal " "pain, blood in stool, constipation, diarrhea, nausea and vomiting.        Heartburn   Endocrine: Negative for polydipsia.   Genitourinary:  Positive for difficulty urinating. Negative for dysuria, frequency and hematuria.   Musculoskeletal:  Positive for back pain. Negative for arthralgias, gait problem, joint swelling, myalgias, neck pain and neck stiffness.   Skin:  Negative for color change and rash.   Neurological:  Positive for numbness. Negative for dizziness, tremors, syncope, weakness, light-headedness and headaches.   Hematological:  Negative for adenopathy. Bruises/bleeds easily.   Psychiatric/Behavioral:  Positive for confusion and dysphoric mood. Negative for agitation and sleep disturbance. The patient is not nervous/anxious.        PHYSICAL EXAM  /52   Pulse 87   Temp 36.3 °C (97.3 °F) (Temporal)   Ht 1.549 m (5' 1\")   Wt (!) 44.2 kg (97 lb 6.4 oz)   SpO2 99%   BMI 18.40 kg/m²   Physical Exam  Vitals reviewed. Chaperone present: daughter.   Constitutional:       General: She is not in acute distress.     Appearance: Normal appearance.   HENT:      Head: Normocephalic and atraumatic.   Eyes:      General: No scleral icterus.     Extraocular Movements: Extraocular movements intact.      Conjunctiva/sclera: Conjunctivae normal.      Pupils: Pupils are equal, round, and reactive to light.   Pulmonary:      Effort: Pulmonary effort is normal. No respiratory distress.   Musculoskeletal:         General: No swelling, tenderness or deformity.      Cervical back: Normal range of motion and neck supple. No tenderness.      Right lower leg: No edema.      Left lower leg: No edema.      Comments: Mild scoliosis  No synovitis of examined joints  Using walker   With assistance walks well   Skin:     Coloration: Skin is not pale.      Findings: No erythema or rash.   Neurological:      General: No focal deficit present.      Mental Status: She is alert and oriented to person, place, and time. Mental " status is at baseline.      Gait: Gait normal.   Psychiatric:         Mood and Affect: Mood normal.         Assessment/plan  Problem List Items Addressed This Visit       Osteoporosis    Overview     DXA 6/2023  BMD  LS spine        0.906    T-score -2.3   Total hip       0.626                 -3.0  Femoral neck 0.604               -3.1         Current Assessment & Plan     Pt and daughter state this is not the reason for appointment.  Chart reviewed.  Pt had CKD and hyperparathyroidism and hard to interpret bone density in this setting as it may be due to metabolic bone disease.   Also, given CKD, most medications are contraindicated.  Pt does see nephrology and endocrinology who can best determine whether she has metabolic bone disease causing this         Secondary hyperparathyroidism of renal origin (Multi)    Chronic renal disease, stage IV (Multi)    Primary osteoarthritis involving multiple joints - Primary    Current Assessment & Plan     Pt has CKD.  NSAIDs are contraindicated.   Pt has confusion and therefore, muscle relaxers and any controlled substance is contraindicated.   Pt reports relief with tylenol and advised to also try  voltaren gel which shouldn't affect CKD.            Relevant Medications    diclofenac sodium (Voltaren) 1 % gel   The patient's labs, radiology images and reports and other tests done prior to appointment were obtained, reviewed and summarized as applicable from the physician portal, EHR symptoms and/or outside sources.  Pertinent positive and negative findings were considered in medical decision making.  Old records were reviewed and further were requested from previous physicians  All questions were answered and patient was counseled regarding diagnosis, prognosis, risks and benefits of various treatment options and importance of compliance with therapy    Follow up: prn                Patient was identified as a fall risk. Risk prevention instructions provided.

## 2024-05-24 ENCOUNTER — PATIENT OUTREACH (OUTPATIENT)
Dept: PRIMARY CARE | Facility: CLINIC | Age: 74
End: 2024-05-24
Payer: MEDICARE

## 2024-05-24 ENCOUNTER — DOCUMENTATION (OUTPATIENT)
Dept: PRIMARY CARE | Facility: CLINIC | Age: 74
End: 2024-05-24
Payer: MEDICARE

## 2024-05-24 NOTE — PROGRESS NOTES
Discharge Facility: [facility]  Discharge Diagnosis:  Type 2 diabetes mellitus with diabetic chronic kidney disease  Admission Date: 5/9/24  Discharge Date:  5/23/24    PCP Appointment Date: 6/3/24  Specialist Appointment Date:   - Urology:   Hospital Encounter and Summary:  not available at this time  See discharge assessment below for further details    Medications  Medications reviewed with patient/caregiver?: No (records not available. pr to bring to follow up with PCP for review.) (5/24/2024  4:18 PM)  Is the patient having any side effects they believe may be caused by any medication additions or changes?: No (5/24/2024  4:18 PM)  Does the patient have all medications ordered at discharge?: Yes (5/24/2024  4:18 PM)  Care Management Interventions: No intervention needed (5/24/2024  4:18 PM)  Is the patient taking all medications as directed (includes completed medication regime)?: Yes (5/24/2024  4:18 PM)  Care Management Interventions: Provided patient education (5/24/2024  4:18 PM)    Appointments  Does the patient have a primary care provider?: Yes (5/24/2024  4:18 PM)  Care Management Interventions: Verified appointment date/time/provider (5/24/2024  4:18 PM)  Has the patient kept scheduled appointments due by today?: Yes (5/24/2024  4:18 PM)  Care Management Interventions: Advised patient to keep appointment (5/24/2024  4:18 PM)    Self Management  What is the home health agency?: [facility] (5/24/2024  4:18 PM)  Has home health visited the patient within 72 hours of discharge?: Call prior to 72 hours (5/24/2024  4:18 PM)    Patient Teaching  Does the patient have access to their discharge instructions?: Yes (5/24/2024  4:18 PM)  Care Management Interventions: Reviewed instructions with patient (5/24/2024  4:18 PM)  What is the patient's perception of their health status since discharge?: Improving (5/24/2024  4:18 PM)  Is the patient/caregiver able to teach back the hierarchy of who to  call/visit for symptoms/problems? PCP, Specialist, Home Health nurse, Urgent Care, ED, 911: Yes (5/24/2024  4:18 PM)  Patient/Caregiver Education Comments: Spoke with pt daughter Gregoria who states pt is doing okay. States she got all her meds set up and got a new wheeled walker today. Reports she seems to be managing the reza okay. Denies additional needs at this time. (5/24/2024  4:18 PM)

## 2024-05-28 PROCEDURE — G0180 MD CERTIFICATION HHA PATIENT: HCPCS | Performed by: INTERNAL MEDICINE

## 2024-06-03 ENCOUNTER — PATIENT OUTREACH (OUTPATIENT)
Dept: PRIMARY CARE | Facility: CLINIC | Age: 74
End: 2024-06-03

## 2024-06-03 ENCOUNTER — OFFICE VISIT (OUTPATIENT)
Dept: PRIMARY CARE | Facility: CLINIC | Age: 74
End: 2024-06-03
Payer: MEDICARE

## 2024-06-03 ENCOUNTER — DOCUMENTATION (OUTPATIENT)
Dept: PRIMARY CARE | Facility: CLINIC | Age: 74
End: 2024-06-03

## 2024-06-03 ENCOUNTER — LAB (OUTPATIENT)
Dept: LAB | Facility: LAB | Age: 74
End: 2024-06-03
Payer: MEDICARE

## 2024-06-03 VITALS
TEMPERATURE: 97.6 F | WEIGHT: 96 LBS | HEIGHT: 61 IN | HEART RATE: 95 BPM | DIASTOLIC BLOOD PRESSURE: 86 MMHG | SYSTOLIC BLOOD PRESSURE: 108 MMHG | BODY MASS INDEX: 18.12 KG/M2 | OXYGEN SATURATION: 96 %

## 2024-06-03 DIAGNOSIS — N17.9 ACUTE KIDNEY INJURY (CMS-HCC): ICD-10-CM

## 2024-06-03 DIAGNOSIS — R33.9 URINARY RETENTION: ICD-10-CM

## 2024-06-03 DIAGNOSIS — R41.82 ALTERED MENTAL STATUS, UNSPECIFIED ALTERED MENTAL STATUS TYPE: ICD-10-CM

## 2024-06-03 DIAGNOSIS — R73.9 ACUTE HYPERGLYCEMIA: ICD-10-CM

## 2024-06-03 DIAGNOSIS — I10 BENIGN ESSENTIAL HTN: ICD-10-CM

## 2024-06-03 DIAGNOSIS — N18.32 CHRONIC KIDNEY DISEASE, STAGE 3B (MULTI): ICD-10-CM

## 2024-06-03 LAB
ALBUMIN SERPL BCP-MCNC: 4.1 G/DL (ref 3.4–5)
ALP SERPL-CCNC: 71 U/L (ref 33–136)
ALT SERPL W P-5'-P-CCNC: 16 U/L (ref 7–45)
ANION GAP SERPL CALC-SCNC: 16 MMOL/L (ref 10–20)
AST SERPL W P-5'-P-CCNC: 23 U/L (ref 9–39)
BASOPHILS # BLD AUTO: 0.02 X10*3/UL (ref 0–0.1)
BASOPHILS NFR BLD AUTO: 0.3 %
BILIRUB DIRECT SERPL-MCNC: 0.1 MG/DL (ref 0–0.3)
BILIRUB SERPL-MCNC: 0.3 MG/DL (ref 0–1.2)
BUN SERPL-MCNC: 48 MG/DL (ref 6–23)
CALCIUM SERPL-MCNC: 9.3 MG/DL (ref 8.6–10.6)
CHLORIDE SERPL-SCNC: 102 MMOL/L (ref 98–107)
CO2 SERPL-SCNC: 23 MMOL/L (ref 21–32)
CREAT SERPL-MCNC: 1.76 MG/DL (ref 0.5–1.05)
EGFRCR SERPLBLD CKD-EPI 2021: 30 ML/MIN/1.73M*2
EOSINOPHIL # BLD AUTO: 0.14 X10*3/UL (ref 0–0.4)
EOSINOPHIL NFR BLD AUTO: 2.4 %
ERYTHROCYTE [DISTWIDTH] IN BLOOD BY AUTOMATED COUNT: 14.3 % (ref 11.5–14.5)
GLUCOSE SERPL-MCNC: 322 MG/DL (ref 74–99)
HCT VFR BLD AUTO: 35.1 % (ref 36–46)
HGB BLD-MCNC: 11.7 G/DL (ref 12–16)
IMM GRANULOCYTES # BLD AUTO: 0.02 X10*3/UL (ref 0–0.5)
IMM GRANULOCYTES NFR BLD AUTO: 0.3 % (ref 0–0.9)
LYMPHOCYTES # BLD AUTO: 1.25 X10*3/UL (ref 0.8–3)
LYMPHOCYTES NFR BLD AUTO: 21 %
MCH RBC QN AUTO: 30.2 PG (ref 26–34)
MCHC RBC AUTO-ENTMCNC: 33.3 G/DL (ref 32–36)
MCV RBC AUTO: 91 FL (ref 80–100)
MONOCYTES # BLD AUTO: 0.46 X10*3/UL (ref 0.05–0.8)
MONOCYTES NFR BLD AUTO: 7.7 %
NEUTROPHILS # BLD AUTO: 4.06 X10*3/UL (ref 1.6–5.5)
NEUTROPHILS NFR BLD AUTO: 68.3 %
NRBC BLD-RTO: 0 /100 WBCS (ref 0–0)
PLATELET # BLD AUTO: 315 X10*3/UL (ref 150–450)
POTASSIUM SERPL-SCNC: 5 MMOL/L (ref 3.5–5.3)
PROT SERPL-MCNC: 7.2 G/DL (ref 6.4–8.2)
RBC # BLD AUTO: 3.88 X10*6/UL (ref 4–5.2)
SODIUM SERPL-SCNC: 136 MMOL/L (ref 136–145)
WBC # BLD AUTO: 6 X10*3/UL (ref 4.4–11.3)

## 2024-06-03 PROCEDURE — 3051F HG A1C>EQUAL 7.0%<8.0%: CPT | Performed by: INTERNAL MEDICINE

## 2024-06-03 PROCEDURE — 3048F LDL-C <100 MG/DL: CPT | Performed by: INTERNAL MEDICINE

## 2024-06-03 PROCEDURE — 4010F ACE/ARB THERAPY RXD/TAKEN: CPT | Performed by: INTERNAL MEDICINE

## 2024-06-03 PROCEDURE — 1123F ACP DISCUSS/DSCN MKR DOCD: CPT | Performed by: INTERNAL MEDICINE

## 2024-06-03 PROCEDURE — 3074F SYST BP LT 130 MM HG: CPT | Performed by: INTERNAL MEDICINE

## 2024-06-03 PROCEDURE — 80053 COMPREHEN METABOLIC PANEL: CPT

## 2024-06-03 PROCEDURE — 36415 COLL VENOUS BLD VENIPUNCTURE: CPT

## 2024-06-03 PROCEDURE — 1159F MED LIST DOCD IN RCRD: CPT | Performed by: INTERNAL MEDICINE

## 2024-06-03 PROCEDURE — 1036F TOBACCO NON-USER: CPT | Performed by: INTERNAL MEDICINE

## 2024-06-03 PROCEDURE — 3079F DIAST BP 80-89 MM HG: CPT | Performed by: INTERNAL MEDICINE

## 2024-06-03 PROCEDURE — 99495 TRANSJ CARE MGMT MOD F2F 14D: CPT | Performed by: INTERNAL MEDICINE

## 2024-06-03 PROCEDURE — 3062F POS MACROALBUMINURIA REV: CPT | Performed by: INTERNAL MEDICINE

## 2024-06-03 PROCEDURE — 1160F RVW MEDS BY RX/DR IN RCRD: CPT | Performed by: INTERNAL MEDICINE

## 2024-06-03 PROCEDURE — 82248 BILIRUBIN DIRECT: CPT

## 2024-06-03 PROCEDURE — 85025 COMPLETE CBC W/AUTO DIFF WBC: CPT

## 2024-06-03 RX ORDER — IBANDRONATE SODIUM 150 MG/1
150 TABLET, FILM COATED ORAL
COMMUNITY

## 2024-06-03 RX ORDER — OXYMETAZOLINE HYDROCHLORIDE OPHTHALMIC 1 MG/ML
1 SOLUTION/ DROPS OPHTHALMIC
COMMUNITY

## 2024-06-03 RX ORDER — ALENDRONATE SODIUM 70 MG/1
70 TABLET ORAL
COMMUNITY

## 2024-06-03 ASSESSMENT — ENCOUNTER SYMPTOMS
DEPRESSION: 0
OCCASIONAL FEELINGS OF UNSTEADINESS: 0
LOSS OF SENSATION IN FEET: 0

## 2024-06-03 NOTE — PROGRESS NOTES
"Patient: Janay Hilliard  : 1950  PCP: Taylor Nieto MD  MRN: [id number]  Program: Transitional Care Management  Status: Enrolled  Effective Dates: 2024 - present  Responsible Staff: Martha Almeida LPN  Social Determinants to be Addressed: Physical Activity, Social Connections, Stress, Tobacco Use         Janay Hilliard is a 74 y.o. female presenting today for follow-up after being discharged from the hospital 11 days ago. The main problem requiring admission was altered mental status with acute kidney injury hyperglycemia and hypertensive urgency as a result of urinary retention and hydronephrosis.  Ugarte was placed urinary retention resolved she still has a Ugarte and catheter bag and she will be seeing urology in 2 days.  She was hospitalized for 4 days and had 2 weeks of rehab at [facility].  Only change her medication with the addition of amlodipine for hypertension which was not the hospital.  She sees Dr. Pretty and will need cardiology follow-up.  Blood pressure is well-controlled today no acute dizziness but she will need to be monitored the family is aware. The discharge summary and/or Transitional Care Management documentation was reviewed. Medication reconciliation was performed as indicated via the \"Dre as Reviewed\" timestamp.     Janay Hilliard was contacted by Transitional Care Management services two days after her discharge. This encounter and supporting documentation was reviewed.      Home x 11 days   Sp TITA AMS hyperglycemia and MS chge   Review of Systems  Review of systems was performed and is otherwise negative except as noted in HPI.    /86   Pulse 95   Temp 36.4 °C (97.6 °F) (Oral)   Ht 1.549 m (5' 1\")   Wt (!) 43.5 kg (96 lb)   SpO2 96%   BMI 18.14 kg/m²     Physical Exam  HEENT is normal  Lungs clear bilaterally  Heart is regular rate rhythm no murmurs  Abdomen benign  Lower extremities no edema      The complexity of medical decision making for this patient's " transitional care is moderate.    Assessment/Plan   Diagnoses and all orders for this visit:  Acute kidney injury (CMS-HCC)  -     Basic metabolic panel; Future  -     Hepatic Function Panel; Future  -     CBC and Auto Differential; Future  Altered mental status, unspecified altered mental status type  -     Basic metabolic panel; Future  -     Hepatic Function Panel; Future  -     CBC and Auto Differential; Future  Urinary retention  -     Basic metabolic panel; Future  -     Hepatic Function Panel; Future  -     CBC and Auto Differential; Future  Acute hyperglycemia  -     Basic metabolic panel; Future  -     Hepatic Function Panel; Future  -     CBC and Auto Differential; Future    Patient is enrolled in chronic care management as of today  She will call with issues  Blood work was ordered  Follow-up with me as scheduled in in July  She needs to see her endocrinologist as scheduled  She will follow-up with urology in 2 days time  Daughter will call nephrology and make an appointment as well  Hospital records and nursing home records were reviewed both electronically and on paper  Medications were reconciled  History was obtained from her daughter

## 2024-06-03 NOTE — PROGRESS NOTES
Patient introduced to Chronic Care Management Services   Patient opted into services on 6/3/24  Services will be performed under the direction of PCP: Taylor Nieto  I have discussed the nature and availability of the service with the patient, the patient's responsibility for potential cost sharing, only one practitioner furnishing services during a calendar month, and the right to stop services at any time.        Chart reviewed prior to CCM outreach. Patient agrees to enroll in CCM program. Patient is in need of respite care or something similar for the beginning of July since her daughter is going out of town for a couple weeks. Patient will need someone to come by a couple times a week to make sure she has dexcom working and also make sure she is eating. I will look into options.

## 2024-06-06 ENCOUNTER — PATIENT OUTREACH (OUTPATIENT)
Dept: PRIMARY CARE | Facility: CLINIC | Age: 74
End: 2024-06-06
Payer: MEDICARE

## 2024-06-06 DIAGNOSIS — E61.1 LOW IRON: Primary | ICD-10-CM

## 2024-06-07 ENCOUNTER — TELEPHONE (OUTPATIENT)
Dept: PRIMARY CARE | Facility: CLINIC | Age: 74
End: 2024-06-07
Payer: MEDICARE

## 2024-06-11 ENCOUNTER — APPOINTMENT (OUTPATIENT)
Dept: PODIATRY | Facility: CLINIC | Age: 74
End: 2024-06-11
Payer: MEDICARE

## 2024-06-12 DIAGNOSIS — I10 BENIGN ESSENTIAL HTN: ICD-10-CM

## 2024-06-12 DIAGNOSIS — I35.9 AORTIC VALVE DISEASE: Primary | ICD-10-CM

## 2024-06-12 NOTE — TELEPHONE ENCOUNTER
Pt daughter called in requesting refill for   Amlodipine 10 mg   DM Davis   Last appt: 6/3/24  Next appt: 7/26/24  Last BW: 6/3/24    Daughter stated pt was prescribed this med during last hospital visit and has already seen dr dobson for f/up on 6/3, stated dr dobson already went over those meds and gave the ok.

## 2024-06-13 RX ORDER — AMLODIPINE BESYLATE 10 MG/1
10 TABLET ORAL DAILY
Qty: 30 TABLET | Refills: 1 | Status: SHIPPED | OUTPATIENT
Start: 2024-06-13

## 2024-06-19 ENCOUNTER — LAB (OUTPATIENT)
Dept: LAB | Facility: LAB | Age: 74
End: 2024-06-19
Payer: MEDICARE

## 2024-06-19 ENCOUNTER — TELEPHONE (OUTPATIENT)
Dept: PRIMARY CARE | Facility: CLINIC | Age: 74
End: 2024-06-19

## 2024-06-19 DIAGNOSIS — E61.1 LOW IRON: ICD-10-CM

## 2024-06-19 PROCEDURE — 82270 OCCULT BLOOD FECES: CPT

## 2024-06-19 NOTE — TELEPHONE ENCOUNTER
Daughter stopped at window stating she has dropped of a stool sample over at the lab. They informed her that the order has  and needs a new one.      Please send order to lab so they can send out specimen

## 2024-06-21 LAB
HEMOCCULT SP1 STL QL: NEGATIVE

## 2024-06-24 DIAGNOSIS — D63.1 ANEMIA DUE TO STAGE 4 CHRONIC KIDNEY DISEASE (MULTI): Primary | ICD-10-CM

## 2024-06-24 DIAGNOSIS — N18.4 ANEMIA DUE TO STAGE 4 CHRONIC KIDNEY DISEASE (MULTI): Primary | ICD-10-CM

## 2024-06-24 RX ORDER — FERROUS SULFATE 325(65) MG
325 TABLET, DELAYED RELEASE (ENTERIC COATED) ORAL
Qty: 60 TABLET | Refills: 2 | Status: SHIPPED | OUTPATIENT
Start: 2024-06-24 | End: 2025-06-24

## 2024-06-25 ENCOUNTER — LAB (OUTPATIENT)
Dept: LAB | Facility: LAB | Age: 74
End: 2024-06-25
Payer: MEDICARE

## 2024-06-25 ENCOUNTER — APPOINTMENT (OUTPATIENT)
Dept: PODIATRY | Facility: CLINIC | Age: 74
End: 2024-06-25
Payer: MEDICARE

## 2024-06-25 VITALS — HEART RATE: 76 BPM | DIASTOLIC BLOOD PRESSURE: 67 MMHG | TEMPERATURE: 97.4 F | SYSTOLIC BLOOD PRESSURE: 127 MMHG

## 2024-06-25 DIAGNOSIS — N25.81 SECONDARY HYPERPARATHYROIDISM OF RENAL ORIGIN (MULTI): ICD-10-CM

## 2024-06-25 DIAGNOSIS — N18.30 CHRONIC KIDNEY DISEASE, STAGE 3 UNSPECIFIED (MULTI): Primary | ICD-10-CM

## 2024-06-25 DIAGNOSIS — E11.42 DIABETIC POLYNEUROPATHY ASSOCIATED WITH TYPE 2 DIABETES MELLITUS (MULTI): ICD-10-CM

## 2024-06-25 DIAGNOSIS — M79.674 TOE PAIN, RIGHT: ICD-10-CM

## 2024-06-25 DIAGNOSIS — B35.1 TINEA UNGUIUM: ICD-10-CM

## 2024-06-25 DIAGNOSIS — M79.675 TOE PAIN, LEFT: ICD-10-CM

## 2024-06-25 LAB
CREAT UR-MCNC: 72.9 MG/DL (ref 20–320)
MICROALBUMIN UR-MCNC: 360.6 MG/L
MICROALBUMIN/CREAT UR: 494.7 UG/MG CREAT

## 2024-06-25 PROCEDURE — 82043 UR ALBUMIN QUANTITATIVE: CPT

## 2024-06-25 PROCEDURE — 3062F POS MACROALBUMINURIA REV: CPT | Performed by: PODIATRIST

## 2024-06-25 PROCEDURE — 83970 ASSAY OF PARATHORMONE: CPT

## 2024-06-25 PROCEDURE — 4010F ACE/ARB THERAPY RXD/TAKEN: CPT | Performed by: PODIATRIST

## 2024-06-25 PROCEDURE — 82570 ASSAY OF URINE CREATININE: CPT

## 2024-06-25 PROCEDURE — 3078F DIAST BP <80 MM HG: CPT | Performed by: PODIATRIST

## 2024-06-25 PROCEDURE — 3051F HG A1C>EQUAL 7.0%<8.0%: CPT | Performed by: PODIATRIST

## 2024-06-25 PROCEDURE — 80069 RENAL FUNCTION PANEL: CPT

## 2024-06-25 PROCEDURE — 36415 COLL VENOUS BLD VENIPUNCTURE: CPT

## 2024-06-25 PROCEDURE — 82306 VITAMIN D 25 HYDROXY: CPT

## 2024-06-25 PROCEDURE — 1123F ACP DISCUSS/DSCN MKR DOCD: CPT | Performed by: PODIATRIST

## 2024-06-25 PROCEDURE — 3048F LDL-C <100 MG/DL: CPT | Performed by: PODIATRIST

## 2024-06-25 PROCEDURE — 11721 DEBRIDE NAIL 6 OR MORE: CPT | Performed by: PODIATRIST

## 2024-06-25 PROCEDURE — 3074F SYST BP LT 130 MM HG: CPT | Performed by: PODIATRIST

## 2024-06-25 PROCEDURE — 1159F MED LIST DOCD IN RCRD: CPT | Performed by: PODIATRIST

## 2024-06-25 PROCEDURE — 1036F TOBACCO NON-USER: CPT | Performed by: PODIATRIST

## 2024-06-25 NOTE — PROGRESS NOTES
"CC: painful thickened and elongated toenails and diabetic care.      HPI: Pt presents for dm foot exam and painful thickened and elongated toenails that are difficult to manage.  Onset was gradual with worsening course until recently.  Aggravated by shoe gear and ambulation.         PCP: Dr. Nieto  Last visit: 4-24-24     PMH  Medical History        Past Medical History:   Diagnosis Date    Personal history of other diseases of the nervous system and sense organs 05/05/2021     History of hearing loss    Type 2 diabetes mellitus with diabetic chronic kidney disease (CMS/[facility]) 03/26/2022     CKD stage 3 secondary to diabetes    Type 2 diabetes mellitus with diabetic chronic kidney disease (CMS/[facility]) 04/13/2022     CKD stage 3 secondary to diabetes         MEDS     Current Outpatient Medications:     apixaban (Eliquis) 5 mg tablet, Take 1 tablet (5 mg) by mouth every 12 hours., Disp: 180 tablet, Rfl: 0    atorvastatin (Lipitor) 20 mg tablet, Take 1 tablet (20 mg) by mouth once daily., Disp: 90 tablet, Rfl: 0    cholecalciferol (Vitamin D-3) 25 MCG (1000 UT) tablet, Take by mouth., Disp: , Rfl:     diclofenac sodium 1 % kit, apply sparingly tto affected  area wice daiy as needed, Disp: , Rfl:     fluticasone (Flonase) 50 mcg/actuation nasal spray, Administer 1 spray into affected nostril(s)., Disp: , Rfl:     insulin aspart (NovoLOG) 100 unit/mL (3 mL) pen, Inject under the skin 3 times a day with meals. 9 units 5 units 9 units w meals, Disp: , Rfl:     lisinopril 5 mg tablet, Take 1 tablet (5 mg) by mouth 2 times a day. (Patient taking differently: Take 1 tablet (5 mg) by mouth once daily.), Disp: 180 tablet, Rfl: 1    omeprazole (PriLOSEC) 20 mg DR capsule, Take 1 capsule (20 mg) by mouth once daily in the morning. Take before meals., Disp: 90 capsule, Rfl: 0    pen needle, diabetic 32 gauge x 5/32\" needle, once daily. Use four pen needles every day, Disp: , Rfl:     Restasis 0.05 % ophthalmic emulsion, INSTILL 1 DROP " IN BOTH EYES TWICE DAILY, Disp: , Rfl:     Tresiba FlexTouch U-100 100 unit/mL (3 mL) injection, Inject 18 Units under the skin once daily in the morning., Disp: 3 mL, Rfl: 0  Allergies        Allergies   Allergen Reactions    Penicillins Cough       Happened when patient was a child    Sulfa (Sulfonamide Antibiotics) Rash      Social History   Social History            Socioeconomic History    Marital status:        Spouse name: Not on file    Number of children: Not on file    Years of education: Not on file    Highest education level: Not on file   Occupational History    Not on file   Tobacco Use    Smoking status: Never    Smokeless tobacco: Never   Vaping Use    Vaping Use: Never used   Substance and Sexual Activity    Alcohol use: Never    Drug use: Never    Sexual activity: Defer   Other Topics Concern    Not on file   Social History Narrative    Not on file      Social Determinants of Health      Financial Resource Strain: Not on file   Food Insecurity: Not on file   Transportation Needs: Not on file   Physical Activity: Not on file   Stress: Not on file   Social Connections: Not on file   Intimate Partner Violence: Not on file   Housing Stability: Not on file         Family History          Family History   Problem Relation Name Age of Onset    Diabetes Father        Diabetes Paternal Grandmother             Surgical History         Past Surgical History:   Procedure Laterality Date    OTHER SURGICAL HISTORY   2021     Tonsillectomy    OTHER SURGICAL HISTORY   2021      section    OTHER SURGICAL HISTORY   2021     Appendectomy            REVIEW OF SYSTEMS  DERM:   + as noted in HPI.         Physical examination:   On General Observation: Patient is a pleasant, cooperative, well developed 73 y.o. diabetic   adult female. The patient is alert and oriented to time, place and person. Patient has normal affect and mood.  There were no vitals taken for this visit.     Vascular:   DP and PT pulses are 2/4 b/l.  mild edema noted. mild varicosities b/l.  CFT  6 seconds to all digits bilateral.  Skin temperature is warm to warm from proximal to distal bilateral.       Muscular: Strength is 5/5 for all instrinsic and extrinsic muscle groups.      Neuro:  Proprioception decreased.   Sensation to vibration is  decreased. Protective sensation decreased  at all pedal sites via [place] 5.07 monofilament bilateral.  Light touch present bilateral.      Derm:    Left toenails: 1-5 Brittleness, crumbling upon debridement, subungual debris, elongation, mycotic appearance, tenderness, and thickness.   Right toenails: 1-5 Brittleness, crumbling upon debridement, subungual debris, elongation, mycotic appearance, tenderness, and thickness.   Hair growth is decreased b/l le     ASSESSMENT:    Tinea Unguium [B35.1]   E11.42  Pain in right toe(s) [M79.674]   Pain in left toe(s) [M79.675]         PLAN:   DM foot care and DM foot manifestations were reviewed.  The patient was educated on clinical findings, diagnosis and treatment plans. Patient understands all that has been explained and all questions were answered to apparent satisfaction.      - Debrided toenails 1-10 in length and height.   - Follow up in 9-12 weeks.         Haim Best DPM

## 2024-06-26 LAB
25(OH)D3 SERPL-MCNC: 49 NG/ML (ref 30–100)
ALBUMIN SERPL BCP-MCNC: 4.1 G/DL (ref 3.4–5)
ANION GAP SERPL CALC-SCNC: 15 MMOL/L (ref 10–20)
BUN SERPL-MCNC: 50 MG/DL (ref 6–23)
CALCIUM SERPL-MCNC: 8.9 MG/DL (ref 8.6–10.6)
CHLORIDE SERPL-SCNC: 104 MMOL/L (ref 98–107)
CO2 SERPL-SCNC: 23 MMOL/L (ref 21–32)
CREAT SERPL-MCNC: 1.61 MG/DL (ref 0.5–1.05)
EGFRCR SERPLBLD CKD-EPI 2021: 33 ML/MIN/1.73M*2
GLUCOSE SERPL-MCNC: 324 MG/DL (ref 74–99)
PHOSPHATE SERPL-MCNC: 5 MG/DL (ref 2.5–4.9)
POTASSIUM SERPL-SCNC: 4.6 MMOL/L (ref 3.5–5.3)
PTH-INTACT SERPL-MCNC: 138.9 PG/ML (ref 18.5–88)
SODIUM SERPL-SCNC: 137 MMOL/L (ref 136–145)

## 2024-06-28 ENCOUNTER — PATIENT OUTREACH (OUTPATIENT)
Dept: PRIMARY CARE | Facility: CLINIC | Age: 74
End: 2024-06-28
Payer: MEDICARE

## 2024-06-28 DIAGNOSIS — I10 BENIGN ESSENTIAL HTN: ICD-10-CM

## 2024-06-28 DIAGNOSIS — N18.32 CHRONIC KIDNEY DISEASE, STAGE 3B (MULTI): ICD-10-CM

## 2024-06-28 NOTE — PROGRESS NOTES
Chart reviewed prior to CCM outreach. I talked to the patient's daughter, Gregoria. She said patient was discharged from Home Health last week but her brother is going to come to town to be with the patient while Gregoria is out of town. Gregoria also said they are still waiting on a continuous glucose monitor. They are working with patients endocrinologist to get that ordered. Nothing needed at this time.

## 2024-07-02 DIAGNOSIS — I10 BENIGN ESSENTIAL HTN: ICD-10-CM

## 2024-07-02 DIAGNOSIS — I63.50 CEREBROVASCULAR ACCIDENT (CVA) DUE TO STENOSIS OF CEREBRAL ARTERY (MULTI): ICD-10-CM

## 2024-07-02 RX ORDER — ATORVASTATIN CALCIUM 20 MG/1
20 TABLET, FILM COATED ORAL DAILY
Qty: 30 TABLET | Refills: 0 | Status: SHIPPED | OUTPATIENT
Start: 2024-07-02

## 2024-07-02 NOTE — TELEPHONE ENCOUNTER
Spoke w/patient, needs refill on  Atorvastatin 20 mg - 2 pills left  [facility] to [facility]  Last appt. 4/24/24  Upcoming appt. 7/26/24  Last BW 6/3/24

## 2024-07-26 ENCOUNTER — APPOINTMENT (OUTPATIENT)
Dept: PRIMARY CARE | Facility: CLINIC | Age: 74
End: 2024-07-26
Payer: MEDICARE

## 2024-07-26 ENCOUNTER — LAB (OUTPATIENT)
Dept: LAB | Facility: LAB | Age: 74
End: 2024-07-26
Payer: MEDICARE

## 2024-07-26 ENCOUNTER — PATIENT OUTREACH (OUTPATIENT)
Dept: PRIMARY CARE | Facility: CLINIC | Age: 74
End: 2024-07-26

## 2024-07-26 VITALS
BODY MASS INDEX: 18.12 KG/M2 | HEIGHT: 61 IN | HEART RATE: 80 BPM | WEIGHT: 96 LBS | TEMPERATURE: 97.6 F | SYSTOLIC BLOOD PRESSURE: 118 MMHG | OXYGEN SATURATION: 98 % | DIASTOLIC BLOOD PRESSURE: 66 MMHG

## 2024-07-26 DIAGNOSIS — N25.81 SECONDARY HYPERPARATHYROIDISM OF RENAL ORIGIN (MULTI): ICD-10-CM

## 2024-07-26 DIAGNOSIS — E46 PROTEIN-CALORIE MALNUTRITION, UNSPECIFIED SEVERITY (MULTI): ICD-10-CM

## 2024-07-26 DIAGNOSIS — R35.0 URINARY FREQUENCY: ICD-10-CM

## 2024-07-26 DIAGNOSIS — R35.0 URINARY FREQUENCY: Primary | ICD-10-CM

## 2024-07-26 DIAGNOSIS — I10 BENIGN ESSENTIAL HTN: ICD-10-CM

## 2024-07-26 DIAGNOSIS — Z79.4 TYPE 2 DIABETES MELLITUS WITH STABLE PROLIFERATIVE RETINOPATHY OF BOTH EYES, WITH LONG-TERM CURRENT USE OF INSULIN (MULTI): ICD-10-CM

## 2024-07-26 DIAGNOSIS — I73.9 PERIPHERAL VASCULAR DISEASE, UNSPECIFIED (CMS-HCC): ICD-10-CM

## 2024-07-26 DIAGNOSIS — E78.5 DYSLIPIDEMIA: ICD-10-CM

## 2024-07-26 DIAGNOSIS — M81.0 AGE-RELATED OSTEOPOROSIS WITHOUT CURRENT PATHOLOGICAL FRACTURE: ICD-10-CM

## 2024-07-26 DIAGNOSIS — K21.9 GASTROESOPHAGEAL REFLUX DISEASE WITHOUT ESOPHAGITIS: ICD-10-CM

## 2024-07-26 DIAGNOSIS — N18.4 CHRONIC RENAL DISEASE, STAGE IV (MULTI): ICD-10-CM

## 2024-07-26 DIAGNOSIS — E11.3553 TYPE 2 DIABETES MELLITUS WITH STABLE PROLIFERATIVE RETINOPATHY OF BOTH EYES, WITH LONG-TERM CURRENT USE OF INSULIN (MULTI): ICD-10-CM

## 2024-07-26 PROBLEM — N18.32 CHRONIC KIDNEY DISEASE, STAGE 3B (MULTI): Status: RESOLVED | Noted: 2023-01-23 | Resolved: 2024-07-26

## 2024-07-26 PROCEDURE — 87086 URINE CULTURE/COLONY COUNT: CPT

## 2024-07-26 PROCEDURE — 81001 URINALYSIS AUTO W/SCOPE: CPT

## 2024-07-26 ASSESSMENT — PATIENT HEALTH QUESTIONNAIRE - PHQ9
SUM OF ALL RESPONSES TO PHQ9 QUESTIONS 1 AND 2: 2
2. FEELING DOWN, DEPRESSED OR HOPELESS: SEVERAL DAYS
1. LITTLE INTEREST OR PLEASURE IN DOING THINGS: NOT AT ALL
SUM OF ALL RESPONSES TO PHQ9 QUESTIONS 1 AND 2: 0
2. FEELING DOWN, DEPRESSED OR HOPELESS: NOT AT ALL
10. IF YOU CHECKED OFF ANY PROBLEMS, HOW DIFFICULT HAVE THESE PROBLEMS MADE IT FOR YOU TO DO YOUR WORK, TAKE CARE OF THINGS AT HOME, OR GET ALONG WITH OTHER PEOPLE: SOMEWHAT DIFFICULT
1. LITTLE INTEREST OR PLEASURE IN DOING THINGS: SEVERAL DAYS

## 2024-07-26 ASSESSMENT — ENCOUNTER SYMPTOMS
DEPRESSION: 0
LOSS OF SENSATION IN FEET: 0
OCCASIONAL FEELINGS OF UNSTEADINESS: 0

## 2024-07-26 NOTE — PROGRESS NOTES
Chart reviewed prior to CCM outreach. I spoke with the patient and her daughter, Gregoria. Patient has an appointment with Dr. Nieto today. Daughter says patient is lacking any motivation/drive to even get up and get dressed daily. I discussed this with the patient and she said she just doesn't feel there is a point to getting up. Daughter was wondering if it would be helpful for patient to talk to a counselor/therapist. I will discuss this with Dr. Nieto. We also discussed assisted living and what that would mean/what that would look like for the patient. She said she does not feel she is at that point in life but she does worry about something happening to her when she is home alone. I talked to them about [facility] and they agreed to having me reach out to [facility]. I am going to talk to the patient's daughter next week in regards to the patient's medications and everything she is currently taking.

## 2024-07-26 NOTE — PROGRESS NOTES
Subjective   Patient ID: Janay Hilliard is a 74 y.o. female who presents for Follow-up (EP.  Follow up.  Labs done in June.  Daughter says concerns she is not good.).  HPI  Patient presents today for a follow-up appointment.  She has been feeling a little bit more confused tired and rundown.  She is here with her daughter.  Her daughter states that she is not doing well as she wants has.  She continues on her insulin and regular medications.  She does not have any regular support at home however he cannot get home care because its out-of-pocket.  She lives in independent living currently.  He had a long talk with the chronic care manager today and they are going to take information from [facility] and look for assisted living options as the patient is becoming aware that it is harder and harder for her daughter to help her with chronic health issues and also she realizes that she cannot keep track of things herself.  For a while she was struggling with her blood glucometer she has a CGM however she is doing little better with that now.  She has multiple doctors appointments that her daughter helps her get to.  She has been seeing endocrinology at [facility] and they have been managing her insulin.  She has not had any low blood sugars however she does have occasional low blood pressures.  Did not really have a direct relationship to that.  She did gain some weight when her son was in town and he stayed with her while her daughter was on vacation and she was eating more regularly.  Patient denies any chest pains headaches dizziness lightheadedness or shortness of breath she does not have any lower extremity edema.  She does have some memory and focus issues.  She is currently been more tired and rundown and lacking motivation    Review of Systems  Review of systems was performed and is otherwise negative except as noted in HPI.    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, with long-term current use of insulin (Multi)  -     Lipid Panel; Future  -     Comprehensive Metabolic Panel; Future  -     TSH with reflex to Free T4 if abnormal; Future  Secondary hyperparathyroidism of renal origin (Multi)  Protein-calorie malnutrition, unspecified severity (Multi)  Age-related osteoporosis without current pathological fracture  Benign essential HTN  -     Lipid Panel; Future  -     Comprehensive Metabolic Panel; Future  Peripheral vascular disease, unspecified (CMS-HCC)  Gastroesophageal reflux disease without esophagitis  -     CBC and Auto Differential; Future  -     Iron and TIBC; Future  -     Ferritin; Future  Chronic renal disease, stage IV (Multi)  -     CBC and Auto Differential; Future  -     Iron and TIBC; Future  -     Ferritin; Future  Dyslipidemia  -     Lipid Panel; Future  -     Comprehensive Metabolic Panel; Future    Call w issues    Fu    2 mos    Check bw   Will rectify med list w ccm manager and  will call when needs  refills  Patient was referred to care control  Blood work before next appointment  Reviewed last blood work  Taylor Nieto MD   "

## 2024-07-27 LAB
APPEARANCE UR: CLEAR
BILIRUB UR STRIP.AUTO-MCNC: NEGATIVE MG/DL
COLOR UR: ABNORMAL
GLUCOSE UR STRIP.AUTO-MCNC: NORMAL MG/DL
KETONES UR STRIP.AUTO-MCNC: NEGATIVE MG/DL
LEUKOCYTE ESTERASE UR QL STRIP.AUTO: ABNORMAL
MUCOUS THREADS #/AREA URNS AUTO: NORMAL /LPF
NITRITE UR QL STRIP.AUTO: NEGATIVE
PH UR STRIP.AUTO: 5.5 [PH]
PROT UR STRIP.AUTO-MCNC: ABNORMAL MG/DL
RBC # UR STRIP.AUTO: NEGATIVE /UL
RBC #/AREA URNS AUTO: NORMAL /HPF
SP GR UR STRIP.AUTO: 1.01
UROBILINOGEN UR STRIP.AUTO-MCNC: NORMAL MG/DL
WBC #/AREA URNS AUTO: NORMAL /HPF

## 2024-07-28 LAB — BACTERIA UR CULT: NORMAL

## 2024-08-07 DIAGNOSIS — I10 BENIGN ESSENTIAL HTN: ICD-10-CM

## 2024-08-07 DIAGNOSIS — I63.50 CEREBROVASCULAR ACCIDENT (CVA) DUE TO STENOSIS OF CEREBRAL ARTERY (MULTI): ICD-10-CM

## 2024-08-07 DIAGNOSIS — K21.00 GASTROESOPHAGEAL REFLUX DISEASE WITH ESOPHAGITIS WITHOUT HEMORRHAGE: ICD-10-CM

## 2024-08-07 RX ORDER — ATORVASTATIN CALCIUM 20 MG/1
20 TABLET, FILM COATED ORAL DAILY
Qty: 90 TABLET | Refills: 0 | Status: SHIPPED | OUTPATIENT
Start: 2024-08-07

## 2024-08-07 RX ORDER — OMEPRAZOLE 20 MG/1
20 CAPSULE, DELAYED RELEASE ORAL
Qty: 90 CAPSULE | Refills: 0 | Status: SHIPPED | OUTPATIENT
Start: 2024-08-07 | End: 2024-11-05

## 2024-08-07 NOTE — TELEPHONE ENCOUNTER
Pt called in requesting refill for  Amlodipine 10 mg (completely out)  Omeprazole 20 mg  Eliquis 5 mg  Atorvastatin 20 mg   DM Davis   Last appt: 7/26/24  Next appt: 9/30/24  Last BW: 7/26/24    Requested 90 day

## 2024-08-07 NOTE — TELEPHONE ENCOUNTER
LAST APPT 07/26/24  NEXT APPT 09/30/24  LAST BW 07/26/24    Amlodipine 10 mg (completely out)  I looked in meds and did not find on outside list and then I checked [employer] and it looks like if was discontinued

## 2024-08-19 ENCOUNTER — PATIENT OUTREACH (OUTPATIENT)
Dept: PRIMARY CARE | Facility: CLINIC | Age: 74
End: 2024-08-19
Payer: MEDICARE

## 2024-08-19 DIAGNOSIS — E11.3553 TYPE 2 DIABETES MELLITUS WITH STABLE PROLIFERATIVE RETINOPATHY OF BOTH EYES, WITH LONG-TERM CURRENT USE OF INSULIN (MULTI): ICD-10-CM

## 2024-08-19 DIAGNOSIS — Z79.4 TYPE 2 DIABETES MELLITUS WITH STABLE PROLIFERATIVE RETINOPATHY OF BOTH EYES, WITH LONG-TERM CURRENT USE OF INSULIN (MULTI): ICD-10-CM

## 2024-08-19 DIAGNOSIS — I10 BENIGN ESSENTIAL HTN: ICD-10-CM

## 2024-08-19 NOTE — PROGRESS NOTES
Chart reviewed prior to CCM outreach. I communicated with Janay's daughter, Gregoria. She sent me pictures of what medications the patient is taking and I was able to update her medication list. Patient is not in need of any refills at this time. Nothing else needed from me at this time.

## 2024-08-30 ENCOUNTER — TELEPHONE (OUTPATIENT)
Dept: PRIMARY CARE | Facility: CLINIC | Age: 74
End: 2024-08-30
Payer: MEDICARE

## 2024-08-30 NOTE — TELEPHONE ENCOUNTER
Pt called ldm requesting Rx refill on   Lisinopril 5 mg   Send to [facility] [address]     Last seen 10/23/24  Scheduled 09/30/24  Pending B/w     Pt has 4 tablets left

## 2024-09-24 ENCOUNTER — APPOINTMENT (OUTPATIENT)
Dept: PODIATRY | Facility: CLINIC | Age: 74
End: 2024-09-24
Payer: MEDICARE

## 2024-09-24 VITALS — WEIGHT: 98 LBS | BODY MASS INDEX: 18.52 KG/M2

## 2024-09-24 DIAGNOSIS — M79.674 TOE PAIN, RIGHT: ICD-10-CM

## 2024-09-24 DIAGNOSIS — E11.42 DIABETIC POLYNEUROPATHY ASSOCIATED WITH TYPE 2 DIABETES MELLITUS (MULTI): ICD-10-CM

## 2024-09-24 DIAGNOSIS — B35.1 TINEA UNGUIUM: ICD-10-CM

## 2024-09-24 DIAGNOSIS — S90.219A SUBUNGUAL HEMATOMA OF GREAT TOE: Primary | ICD-10-CM

## 2024-09-24 DIAGNOSIS — M79.675 TOE PAIN, LEFT: ICD-10-CM

## 2024-09-24 PROCEDURE — 4010F ACE/ARB THERAPY RXD/TAKEN: CPT | Performed by: PODIATRIST

## 2024-09-24 PROCEDURE — 3051F HG A1C>EQUAL 7.0%<8.0%: CPT | Performed by: PODIATRIST

## 2024-09-24 PROCEDURE — 99212 OFFICE O/P EST SF 10 MIN: CPT | Performed by: PODIATRIST

## 2024-09-24 PROCEDURE — 3048F LDL-C <100 MG/DL: CPT | Performed by: PODIATRIST

## 2024-09-24 PROCEDURE — 1123F ACP DISCUSS/DSCN MKR DOCD: CPT | Performed by: PODIATRIST

## 2024-09-24 PROCEDURE — 11721 DEBRIDE NAIL 6 OR MORE: CPT | Performed by: PODIATRIST

## 2024-09-24 PROCEDURE — 3062F POS MACROALBUMINURIA REV: CPT | Performed by: PODIATRIST

## 2024-09-24 NOTE — PROGRESS NOTES
"CC: painful thickened and elongated toenails and diabetic care.      HPI: Pt presents for dm foot exam and painful thickened and elongated toenails that are difficult to manage.  Onset was gradual with worsening course until recently.  Aggravated by shoe gear and ambulation.  Hit her toe on the rollator, nail is discolored.        PCP: Dr. Nieto  Last visit: 7-26-24     PMH  Medical History           Past Medical History:   Diagnosis Date    Personal history of other diseases of the nervous system and sense organs 05/05/2021     History of hearing loss    Type 2 diabetes mellitus with diabetic chronic kidney disease (CMS/HCC) 03/26/2022     CKD stage 3 secondary to diabetes    Type 2 diabetes mellitus with diabetic chronic kidney disease (CMS/HCC) 04/13/2022     CKD stage 3 secondary to diabetes         MEDS     Current Outpatient Medications:     apixaban (Eliquis) 5 mg tablet, Take 1 tablet (5 mg) by mouth every 12 hours., Disp: 180 tablet, Rfl: 0    atorvastatin (Lipitor) 20 mg tablet, Take 1 tablet (20 mg) by mouth once daily., Disp: 90 tablet, Rfl: 0    cholecalciferol (Vitamin D-3) 25 MCG (1000 UT) tablet, Take by mouth., Disp: , Rfl:     diclofenac sodium 1 % kit, apply sparingly tto affected  area wice daiy as needed, Disp: , Rfl:     fluticasone (Flonase) 50 mcg/actuation nasal spray, Administer 1 spray into affected nostril(s)., Disp: , Rfl:     insulin aspart (NovoLOG) 100 unit/mL (3 mL) pen, Inject under the skin 3 times a day with meals. 9 units 5 units 9 units w meals, Disp: , Rfl:     lisinopril 5 mg tablet, Take 1 tablet (5 mg) by mouth 2 times a day. (Patient taking differently: Take 1 tablet (5 mg) by mouth once daily.), Disp: 180 tablet, Rfl: 1    omeprazole (PriLOSEC) 20 mg DR capsule, Take 1 capsule (20 mg) by mouth once daily in the morning. Take before meals., Disp: 90 capsule, Rfl: 0    pen needle, diabetic 32 gauge x 5/32\" needle, once daily. Use four pen needles every day, Disp: , Rfl:     " Restasis 0.05 % ophthalmic emulsion, INSTILL 1 DROP IN BOTH EYES TWICE DAILY, Disp: , Rfl:     Tresiba FlexTouch U-100 100 unit/mL (3 mL) injection, Inject 18 Units under the skin once daily in the morning., Disp: 3 mL, Rfl: 0  Allergies            Allergies   Allergen Reactions    Penicillins Cough       Happened when patient was a child    Sulfa (Sulfonamide Antibiotics) Rash      Social History   Social History                Socioeconomic History    Marital status:        Spouse name: Not on file    Number of children: Not on file    Years of education: Not on file    Highest education level: Not on file   Occupational History    Not on file   Tobacco Use    Smoking status: Never    Smokeless tobacco: Never   Vaping Use    Vaping Use: Never used   Substance and Sexual Activity    Alcohol use: Never    Drug use: Never    Sexual activity: Defer   Other Topics Concern    Not on file   Social History Narrative    Not on file      Social Determinants of Health      Financial Resource Strain: Not on file   Food Insecurity: Not on file   Transportation Needs: Not on file   Physical Activity: Not on file   Stress: Not on file   Social Connections: Not on file   Intimate Partner Violence: Not on file   Housing Stability: Not on file         Family History               Family History   Problem Relation Name Age of Onset    Diabetes Father        Diabetes Paternal Grandmother             Surgical History             Past Surgical History:   Procedure Laterality Date    OTHER SURGICAL HISTORY   2021     Tonsillectomy    OTHER SURGICAL HISTORY   2021      section    OTHER SURGICAL HISTORY   2021     Appendectomy            REVIEW OF SYSTEMS  DERM:   + as noted in HPI.         Physical examination:   On General Observation: Patient is a pleasant, cooperative, well developed 73 y.o. diabetic   adult female. The patient is alert and oriented to time, place and person. Patient has normal affect  and mood.  There were no vitals taken for this visit.     Vascular:  DP and PT pulses are 2/4 b/l.  mild edema noted. mild varicosities b/l.  CFT  6 seconds to all digits bilateral.  Skin temperature is warm to warm from proximal to distal bilateral.       Muscular: Strength is 5/5 for all instrinsic and extrinsic muscle groups.      Neuro:  Proprioception decreased.   Sensation to vibration is  decreased. Protective sensation decreased  at all pedal sites via [place] 5.07 monofilament bilateral.  Light touch present bilateral.      Derm:    Left toenails: 1-5 Brittleness, crumbling upon debridement, subungual debris, elongation, mycotic appearance, tenderness, and thickness.   Right toenails: 1-5 Brittleness, crumbling upon debridement, subungual debris, elongation, mycotic appearance, tenderness, and thickness.   Hair growth is decreased b/l le  Stable hematoma is present right hallux, no odor or drainage or lysis is present    ASSESSMENT:    Subungual hematoma right hallux  Tinea Unguium [B35.1]   E11.42  Pain in right toe(s) [M79.674]   Pain in left toe(s) [M79.675]         PLAN:   Exam  Reviewed with pt, hematoma is dry and no infection or lysis of the nail, reviewed and will monitor the toe, allow the nail to grow out.  DM foot care and DM foot manifestations were reviewed.  The patient was educated on clinical findings, diagnosis and treatment plans. Patient understands all that has been explained and all questions were answered to apparent satisfaction.      - Debrided toenails 1-10 in length and height.   - Follow up in 9-12 weeks.         Haim Best DPM

## 2024-09-26 ENCOUNTER — LAB (OUTPATIENT)
Dept: LAB | Facility: LAB | Age: 74
End: 2024-09-26
Payer: MEDICARE

## 2024-09-26 DIAGNOSIS — N18.4 CHRONIC RENAL DISEASE, STAGE IV (MULTI): ICD-10-CM

## 2024-09-26 DIAGNOSIS — E11.3553 TYPE 2 DIABETES MELLITUS WITH STABLE PROLIFERATIVE RETINOPATHY OF BOTH EYES, WITH LONG-TERM CURRENT USE OF INSULIN: ICD-10-CM

## 2024-09-26 DIAGNOSIS — K21.9 GASTROESOPHAGEAL REFLUX DISEASE WITHOUT ESOPHAGITIS: ICD-10-CM

## 2024-09-26 DIAGNOSIS — Z79.4 TYPE 2 DIABETES MELLITUS WITH STABLE PROLIFERATIVE RETINOPATHY OF BOTH EYES, WITH LONG-TERM CURRENT USE OF INSULIN: ICD-10-CM

## 2024-09-26 DIAGNOSIS — E78.5 DYSLIPIDEMIA: ICD-10-CM

## 2024-09-26 DIAGNOSIS — I10 BENIGN ESSENTIAL HTN: ICD-10-CM

## 2024-09-26 LAB
ALBUMIN SERPL BCP-MCNC: 4.2 G/DL (ref 3.4–5)
ALP SERPL-CCNC: 59 U/L (ref 33–136)
ALT SERPL W P-5'-P-CCNC: 10 U/L (ref 7–45)
ANION GAP SERPL CALC-SCNC: 17 MMOL/L (ref 10–20)
AST SERPL W P-5'-P-CCNC: 19 U/L (ref 9–39)
BASOPHILS # BLD AUTO: 0.03 X10*3/UL (ref 0–0.1)
BASOPHILS NFR BLD AUTO: 0.6 %
BILIRUB SERPL-MCNC: 0.5 MG/DL (ref 0–1.2)
BUN SERPL-MCNC: 34 MG/DL (ref 6–23)
CALCIUM SERPL-MCNC: 9.6 MG/DL (ref 8.6–10.6)
CHLORIDE SERPL-SCNC: 103 MMOL/L (ref 98–107)
CHOLEST SERPL-MCNC: 176 MG/DL (ref 0–199)
CHOLESTEROL/HDL RATIO: 3
CO2 SERPL-SCNC: 24 MMOL/L (ref 21–32)
CREAT SERPL-MCNC: 1.72 MG/DL (ref 0.5–1.05)
EGFRCR SERPLBLD CKD-EPI 2021: 31 ML/MIN/1.73M*2
EOSINOPHIL # BLD AUTO: 0.14 X10*3/UL (ref 0–0.4)
EOSINOPHIL NFR BLD AUTO: 2.6 %
ERYTHROCYTE [DISTWIDTH] IN BLOOD BY AUTOMATED COUNT: 12.6 % (ref 11.5–14.5)
FERRITIN SERPL-MCNC: 109 NG/ML (ref 8–150)
GLUCOSE SERPL-MCNC: 302 MG/DL (ref 74–99)
HCT VFR BLD AUTO: 38.5 % (ref 36–46)
HDLC SERPL-MCNC: 59.1 MG/DL
HGB BLD-MCNC: 12.6 G/DL (ref 12–16)
IMM GRANULOCYTES # BLD AUTO: 0.01 X10*3/UL (ref 0–0.5)
IMM GRANULOCYTES NFR BLD AUTO: 0.2 % (ref 0–0.9)
IRON SATN MFR SERPL: 28 % (ref 25–45)
IRON SERPL-MCNC: 82 UG/DL (ref 35–150)
LDLC SERPL CALC-MCNC: 86 MG/DL
LYMPHOCYTES # BLD AUTO: 2.03 X10*3/UL (ref 0.8–3)
LYMPHOCYTES NFR BLD AUTO: 37.5 %
MCH RBC QN AUTO: 30.9 PG (ref 26–34)
MCHC RBC AUTO-ENTMCNC: 32.7 G/DL (ref 32–36)
MCV RBC AUTO: 94 FL (ref 80–100)
MONOCYTES # BLD AUTO: 0.47 X10*3/UL (ref 0.05–0.8)
MONOCYTES NFR BLD AUTO: 8.7 %
NEUTROPHILS # BLD AUTO: 2.73 X10*3/UL (ref 1.6–5.5)
NEUTROPHILS NFR BLD AUTO: 50.4 %
NON HDL CHOLESTEROL: 117 MG/DL (ref 0–149)
NRBC BLD-RTO: 0 /100 WBCS (ref 0–0)
PLATELET # BLD AUTO: 256 X10*3/UL (ref 150–450)
POTASSIUM SERPL-SCNC: 4.5 MMOL/L (ref 3.5–5.3)
PROT SERPL-MCNC: 7.3 G/DL (ref 6.4–8.2)
RBC # BLD AUTO: 4.08 X10*6/UL (ref 4–5.2)
SODIUM SERPL-SCNC: 139 MMOL/L (ref 136–145)
TIBC SERPL-MCNC: 297 UG/DL (ref 240–445)
TRIGL SERPL-MCNC: 153 MG/DL (ref 0–149)
TSH SERPL-ACNC: 2.52 MIU/L (ref 0.44–3.98)
UIBC SERPL-MCNC: 215 UG/DL (ref 110–370)
VLDL: 31 MG/DL (ref 0–40)
WBC # BLD AUTO: 5.4 X10*3/UL (ref 4.4–11.3)

## 2024-09-26 PROCEDURE — 83550 IRON BINDING TEST: CPT

## 2024-09-26 PROCEDURE — 80061 LIPID PANEL: CPT

## 2024-09-26 PROCEDURE — 83540 ASSAY OF IRON: CPT

## 2024-09-26 PROCEDURE — 36415 COLL VENOUS BLD VENIPUNCTURE: CPT

## 2024-09-26 PROCEDURE — 80053 COMPREHEN METABOLIC PANEL: CPT

## 2024-09-26 PROCEDURE — 85025 COMPLETE CBC W/AUTO DIFF WBC: CPT

## 2024-09-26 PROCEDURE — 84443 ASSAY THYROID STIM HORMONE: CPT

## 2024-09-26 PROCEDURE — 82728 ASSAY OF FERRITIN: CPT

## 2024-09-30 ENCOUNTER — PATIENT OUTREACH (OUTPATIENT)
Dept: PRIMARY CARE | Facility: CLINIC | Age: 74
End: 2024-09-30

## 2024-09-30 ENCOUNTER — APPOINTMENT (OUTPATIENT)
Dept: PRIMARY CARE | Facility: CLINIC | Age: 74
End: 2024-09-30
Payer: MEDICARE

## 2024-09-30 VITALS
HEIGHT: 61 IN | SYSTOLIC BLOOD PRESSURE: 116 MMHG | BODY MASS INDEX: 17.56 KG/M2 | OXYGEN SATURATION: 96 % | DIASTOLIC BLOOD PRESSURE: 68 MMHG | HEART RATE: 76 BPM | WEIGHT: 93 LBS | TEMPERATURE: 97.8 F

## 2024-09-30 DIAGNOSIS — I10 BENIGN ESSENTIAL HTN: ICD-10-CM

## 2024-09-30 DIAGNOSIS — D64.9 ANEMIA, UNSPECIFIED TYPE: ICD-10-CM

## 2024-09-30 DIAGNOSIS — Z23 FLU VACCINE NEED: ICD-10-CM

## 2024-09-30 DIAGNOSIS — E78.5 DYSLIPIDEMIA: ICD-10-CM

## 2024-09-30 DIAGNOSIS — E46 PROTEIN-CALORIE MALNUTRITION, UNSPECIFIED SEVERITY (MULTI): ICD-10-CM

## 2024-09-30 DIAGNOSIS — Z79.4 TYPE 2 DIABETES MELLITUS WITH STABLE PROLIFERATIVE RETINOPATHY OF BOTH EYES, WITH LONG-TERM CURRENT USE OF INSULIN: Primary | ICD-10-CM

## 2024-09-30 DIAGNOSIS — I63.9 CEREBROVASCULAR ACCIDENT (CVA), UNSPECIFIED MECHANISM (MULTI): ICD-10-CM

## 2024-09-30 DIAGNOSIS — E11.3553 TYPE 2 DIABETES MELLITUS WITH STABLE PROLIFERATIVE RETINOPATHY OF BOTH EYES, WITH LONG-TERM CURRENT USE OF INSULIN: ICD-10-CM

## 2024-09-30 DIAGNOSIS — Z79.4 TYPE 2 DIABETES MELLITUS WITH STABLE PROLIFERATIVE RETINOPATHY OF BOTH EYES, WITH LONG-TERM CURRENT USE OF INSULIN: ICD-10-CM

## 2024-09-30 DIAGNOSIS — E11.3553 TYPE 2 DIABETES MELLITUS WITH STABLE PROLIFERATIVE RETINOPATHY OF BOTH EYES, WITH LONG-TERM CURRENT USE OF INSULIN: Primary | ICD-10-CM

## 2024-09-30 PROCEDURE — 3074F SYST BP LT 130 MM HG: CPT | Performed by: INTERNAL MEDICINE

## 2024-09-30 PROCEDURE — 3048F LDL-C <100 MG/DL: CPT | Performed by: INTERNAL MEDICINE

## 2024-09-30 PROCEDURE — 3078F DIAST BP <80 MM HG: CPT | Performed by: INTERNAL MEDICINE

## 2024-09-30 PROCEDURE — G0008 ADMIN INFLUENZA VIRUS VAC: HCPCS | Performed by: INTERNAL MEDICINE

## 2024-09-30 PROCEDURE — 1159F MED LIST DOCD IN RCRD: CPT | Performed by: INTERNAL MEDICINE

## 2024-09-30 PROCEDURE — 1123F ACP DISCUSS/DSCN MKR DOCD: CPT | Performed by: INTERNAL MEDICINE

## 2024-09-30 PROCEDURE — 1036F TOBACCO NON-USER: CPT | Performed by: INTERNAL MEDICINE

## 2024-09-30 PROCEDURE — 3062F POS MACROALBUMINURIA REV: CPT | Performed by: INTERNAL MEDICINE

## 2024-09-30 PROCEDURE — 3051F HG A1C>EQUAL 7.0%<8.0%: CPT | Performed by: INTERNAL MEDICINE

## 2024-09-30 PROCEDURE — 99490 CHRNC CARE MGMT STAFF 1ST 20: CPT | Performed by: INTERNAL MEDICINE

## 2024-09-30 PROCEDURE — 1160F RVW MEDS BY RX/DR IN RCRD: CPT | Performed by: INTERNAL MEDICINE

## 2024-09-30 PROCEDURE — 90662 IIV NO PRSV INCREASED AG IM: CPT | Performed by: INTERNAL MEDICINE

## 2024-09-30 PROCEDURE — 4010F ACE/ARB THERAPY RXD/TAKEN: CPT | Performed by: INTERNAL MEDICINE

## 2024-09-30 PROCEDURE — 99214 OFFICE O/P EST MOD 30 MIN: CPT | Performed by: INTERNAL MEDICINE

## 2024-09-30 RX ORDER — LISINOPRIL 5 MG/1
5 TABLET ORAL 2 TIMES DAILY
Qty: 180 TABLET | Refills: 1 | Status: SHIPPED | OUTPATIENT
Start: 2024-09-30

## 2024-09-30 ASSESSMENT — ENCOUNTER SYMPTOMS
DEPRESSION: 0
OCCASIONAL FEELINGS OF UNSTEADINESS: 0
LOSS OF SENSATION IN FEET: 0

## 2024-09-30 ASSESSMENT — PATIENT HEALTH QUESTIONNAIRE - PHQ9
2. FEELING DOWN, DEPRESSED OR HOPELESS: NOT AT ALL
1. LITTLE INTEREST OR PLEASURE IN DOING THINGS: NOT AT ALL
SUM OF ALL RESPONSES TO PHQ9 QUESTIONS 1 AND 2: 0

## 2024-09-30 NOTE — PROGRESS NOTES
"Chart reviewed prior to [facility] outreach. I spoke with patient and her daughter, Gregoria. Patient states she is doing well living on her own. Daughter states patient has been losing weight and not eating very nutritionally. Patient says she eats waffles for breakfast. I spoke to her about eating something such as eggs that have protein instead of carbs and she said she did not want to do that. Her blood sugar levels at her last blood work were in the 300's. A1c is up to 8.6 which is higher than last time. I discussed what happens when blood sugar levels continue to rise. Patient says, \"I've already lived this long, I don't see an issue\". Patient sees Dr. Nieto today. Dr. Nieto has been notified of what we discussed.  "

## 2024-09-30 NOTE — PROGRESS NOTES
"Subjective   Patient ID: Janay Hilliard is a 74 y.o. female who presents for Follow-up (EP.  Follow up  DMII.  Labs done.  Balance was better for 2 weeks and now back.).  HPI  Patient presents today for a follow-up with her daughter.  She is working with her chronic care management nurse as well.  Biggest issue identified has been her failure to gain weight and she has lost about 5 pounds.  Patient thinks it is a scale discrepancy however patient is visibly thin.  On review of her diet she normally will eat pancakes or waffles for breakfast she probably will not eat lunch and sometimes does not eat dinner.  Her daughter states that she does make herself some dinner things like hamburgers etc.  Her daughter lives separately from her and she is her sole caregiver.  She does her shopping she sees her several times weekly and takes her all her doctors appointment.  We try to support her as much as possible as this is a large physical and psychosocial strength for her.  I talked about getting meals that are premade and can be just warmed up from a local  and there is one that services the apartments.  They are to avoid premade frozen meals and fast foods but 1 of these meal services might be able to tailor some foods to meet her needs.  She denies complaints continues to see her eye doctor regularly.  No cardiovascular neurodeficits are noted  She has no edema  Patient is very stubborn about her food intakes she is a picky eater according to her daughter  She does use some boost but she has lost another 5 pounds  She will have an upcoming neurology appointment    Review of Systems  Review of systems was performed and is otherwise negative except as noted in HPI.  Objective   /68   Pulse 76   Temp 36.6 °C (97.8 °F) (Oral)   Ht 1.549 m (5' 1\")   Wt (!) 42.2 kg (93 lb)   SpO2 96%   BMI 17.57 kg/m²    Physical Exam  She is visibly thin but alert and oriented  HEENT is normal  Lungs clear " bilaterally  Heart is regular rate rhythm no murmurs  Abdomen benign  Lower extremities no edema    Assessment/Plan   Diagnoses and all orders for this visit:  Type 2 diabetes mellitus with stable proliferative retinopathy of both eyes, with long-term current use of insulin (Multi)  -     Hemoglobin A1C; Future  Flu vaccine need  -     Flu vaccine, trivalent, preservative free, HIGH-DOSE, age 65y+ (Fluzone)  Benign essential HTN  -     lisinopril 5 mg tablet; Take 1 tablet (5 mg) by mouth 2 times a day.  -     Lipid Panel; Future  -     Comprehensive Metabolic Panel; Future  Cerebrovascular accident (CVA), unspecified mechanism (Multi)  Protein-calorie malnutrition, unspecified severity (Multi)  Anemia, unspecified type  -     Iron and TIBC; Future  -     Ferritin; Future  -     CBC and Auto Differential; Future  Dyslipidemia  -     Lipid Panel; Future  -     Comprehensive Metabolic Panel; Future    Call with issues  Blood work ordered  Follow-up with me in 3 months  Blood work is reviewed  She got her flu vaccine  She will follow-up with Endo  She needs to increase her calories  Offered nutrition they declined  We talked about a preprepared  they might look into that  We discussed assisted living patient declines  Taylor Nieto MD

## 2024-10-10 ENCOUNTER — LAB (OUTPATIENT)
Dept: LAB | Facility: LAB | Age: 74
End: 2024-10-10
Payer: MEDICARE

## 2024-10-10 DIAGNOSIS — N18.30 CHRONIC KIDNEY DISEASE, STAGE 3 UNSPECIFIED (MULTI): Primary | ICD-10-CM

## 2024-10-10 DIAGNOSIS — D47.2 MONOCLONAL GAMMOPATHY: ICD-10-CM

## 2024-10-10 LAB
25(OH)D3 SERPL-MCNC: 52 NG/ML (ref 30–100)
ALBUMIN SERPL BCP-MCNC: 4.2 G/DL (ref 3.4–5)
ANION GAP SERPL CALC-SCNC: 14 MMOL/L (ref 10–20)
BUN SERPL-MCNC: 53 MG/DL (ref 6–23)
CALCIUM SERPL-MCNC: 9.3 MG/DL (ref 8.6–10.6)
CHLORIDE SERPL-SCNC: 106 MMOL/L (ref 98–107)
CO2 SERPL-SCNC: 27 MMOL/L (ref 21–32)
CREAT SERPL-MCNC: 2.03 MG/DL (ref 0.5–1.05)
CREAT UR-MCNC: 65.2 MG/DL (ref 20–320)
EGFRCR SERPLBLD CKD-EPI 2021: 25 ML/MIN/1.73M*2
GLUCOSE SERPL-MCNC: 188 MG/DL (ref 74–99)
MICROALBUMIN UR-MCNC: 274.9 MG/L
MICROALBUMIN/CREAT UR: 421.6 UG/MG CREAT
PHOSPHATE SERPL-MCNC: 4.5 MG/DL (ref 2.5–4.9)
POTASSIUM SERPL-SCNC: 4.3 MMOL/L (ref 3.5–5.3)
PROT SERPL-MCNC: 7.5 G/DL (ref 6.4–8.2)
PROT UR-ACNC: 49 MG/DL (ref 5–25)
SODIUM SERPL-SCNC: 143 MMOL/L (ref 136–145)

## 2024-10-10 PROCEDURE — 80069 RENAL FUNCTION PANEL: CPT

## 2024-10-10 PROCEDURE — 83970 ASSAY OF PARATHORMONE: CPT

## 2024-10-10 PROCEDURE — 86335 IMMUNFIX E-PHORSIS/URINE/CSF: CPT

## 2024-10-10 PROCEDURE — 36415 COLL VENOUS BLD VENIPUNCTURE: CPT

## 2024-10-10 PROCEDURE — 86334 IMMUNOFIX E-PHORESIS SERUM: CPT

## 2024-10-10 PROCEDURE — 82306 VITAMIN D 25 HYDROXY: CPT

## 2024-10-10 PROCEDURE — 84156 ASSAY OF PROTEIN URINE: CPT

## 2024-10-10 PROCEDURE — 84166 PROTEIN E-PHORESIS/URINE/CSF: CPT

## 2024-10-10 PROCEDURE — 84165 PROTEIN E-PHORESIS SERUM: CPT

## 2024-10-10 PROCEDURE — 82043 UR ALBUMIN QUANTITATIVE: CPT

## 2024-10-10 PROCEDURE — 84155 ASSAY OF PROTEIN SERUM: CPT

## 2024-10-10 PROCEDURE — 82570 ASSAY OF URINE CREATININE: CPT

## 2024-10-11 LAB — PTH-INTACT SERPL-MCNC: 107.6 PG/ML (ref 18.5–88)

## 2024-10-14 LAB
ALBUMIN MFR UR ELPH: 74.5 %
ALBUMIN: 4.2 G/DL (ref 3.4–5)
ALPHA 1 GLOBULIN: 0.3 G/DL (ref 0.2–0.6)
ALPHA 2 GLOBULIN: 0.9 G/DL (ref 0.4–1.1)
ALPHA1 GLOB MFR UR ELPH: 4.9 %
ALPHA2 GLOB MFR UR ELPH: 5 %
B-GLOBULIN MFR UR ELPH: 9.2 %
BETA GLOBULIN: 0.8 G/DL (ref 0.5–1.2)
GAMMA GLOB MFR UR ELPH: 6.4 %
GAMMA GLOBULIN: 1.3 G/DL (ref 0.5–1.4)
IMMUNOFIXATION COMMENT: ABNORMAL
IMMUNOFIXATION COMMENT: ABNORMAL
M-PROTEIN 1 URINE %: 0.5 %
M-PROTEIN 1: 0.1 G/DL
PATH REVIEW - SERUM IMMUNOFIXATION: ABNORMAL
PATH REVIEW - URINE IMMUNOFIXATION: ABNORMAL
PATH REVIEW-SERUM PROTEIN ELECTROPHORESIS: ABNORMAL
PATH REVIEW-URINE PROTEIN ELECTROPHORESIS: ABNORMAL
PROTEIN ELECTROPHORESIS COMMENT: ABNORMAL
URINE ELECTROPHORESIS COMMENT: ABNORMAL

## 2024-10-23 ENCOUNTER — APPOINTMENT (OUTPATIENT)
Dept: PRIMARY CARE | Facility: CLINIC | Age: 74
End: 2024-10-23
Payer: MEDICARE

## 2024-11-12 ENCOUNTER — PATIENT OUTREACH (OUTPATIENT)
Dept: PRIMARY CARE | Facility: CLINIC | Age: 74
End: 2024-11-12
Payer: MEDICARE

## 2024-11-12 DIAGNOSIS — E11.3553 TYPE 2 DIABETES MELLITUS WITH STABLE PROLIFERATIVE RETINOPATHY OF BOTH EYES, WITH LONG-TERM CURRENT USE OF INSULIN: ICD-10-CM

## 2024-11-12 DIAGNOSIS — I10 BENIGN ESSENTIAL HTN: ICD-10-CM

## 2024-11-12 DIAGNOSIS — Z79.4 TYPE 2 DIABETES MELLITUS WITH STABLE PROLIFERATIVE RETINOPATHY OF BOTH EYES, WITH LONG-TERM CURRENT USE OF INSULIN: ICD-10-CM

## 2024-11-12 NOTE — PROGRESS NOTES
Chart reviewed prior to [facility] outreach. I spoke with patients daughter, Gregoria. Patient had a hypoglycemic episode this past weekend. Gregoria stopped by because she was supposed to meet up with the patient and she did not show. When Gregoria got there, the patient was unresponsive. She called 911 and her sugars were below 60. Patient received glucose and was then responsive. Patient's daughter said this was more of a sign that she can no longer live on her own. We have discussed assisted living in the past but patient has been against it and she has been able to live on her own mostly. They are meeting with someone from [facility] and will get tours of facilities set up. She will call if she needs anything from myself or Dr. Nieto.

## 2024-12-13 ENCOUNTER — TELEPHONE (OUTPATIENT)
Dept: PRIMARY CARE | Facility: CLINIC | Age: 74
End: 2024-12-13
Payer: MEDICARE

## 2024-12-13 DIAGNOSIS — I10 BENIGN ESSENTIAL HTN: ICD-10-CM

## 2024-12-13 DIAGNOSIS — I63.50 CEREBROVASCULAR ACCIDENT (CVA) DUE TO STENOSIS OF CEREBRAL ARTERY (MULTI): ICD-10-CM

## 2024-12-13 RX ORDER — ATORVASTATIN CALCIUM 20 MG/1
20 TABLET, FILM COATED ORAL DAILY
Qty: 90 TABLET | Refills: 0 | Status: SHIPPED | OUTPATIENT
Start: 2024-12-13

## 2024-12-13 NOTE — TELEPHONE ENCOUNTER
Pt called ldm requesting Rx refill on   Atorvastatin 20 mg   Eliquis 5 mg   Send to [facility]     Last seen 09/30/24  Scheduled 01/07/24  Pending B/w from 09/30/24

## 2024-12-17 ENCOUNTER — PATIENT OUTREACH (OUTPATIENT)
Dept: PRIMARY CARE | Facility: CLINIC | Age: 74
End: 2024-12-17
Payer: MEDICARE

## 2024-12-17 DIAGNOSIS — I10 BENIGN ESSENTIAL HTN: ICD-10-CM

## 2024-12-17 DIAGNOSIS — Z79.4 TYPE 2 DIABETES MELLITUS WITH STABLE PROLIFERATIVE RETINOPATHY OF BOTH EYES, WITH LONG-TERM CURRENT USE OF INSULIN: ICD-10-CM

## 2024-12-17 DIAGNOSIS — E11.3553 TYPE 2 DIABETES MELLITUS WITH STABLE PROLIFERATIVE RETINOPATHY OF BOTH EYES, WITH LONG-TERM CURRENT USE OF INSULIN: ICD-10-CM

## 2024-12-17 NOTE — PROGRESS NOTES
Chart reviewed prior to [facility] outreach. I spoke with patient's daughter, Gregoria. She said patient has been doing great lately. She saw her Endocrinologist and they gave her some ideas to avoid hypoglycemic episodes and that has been working out well. They met with someone from [facility] and saw a few different assisted living facilities. Patient's daughter said patient has been doing fine lately so she decided to put this on hold until after the holidays. She said the patient has not had any hypoglycemic episodes since meeting with endocrinology. Nothing needed from me at this time. Patient's next appointment with Dr. Nieto is the beginning of January

## 2024-12-26 ENCOUNTER — APPOINTMENT (OUTPATIENT)
Dept: HEMATOLOGY/ONCOLOGY | Facility: CLINIC | Age: 74
End: 2024-12-26
Payer: MEDICARE

## 2025-01-03 ENCOUNTER — LAB (OUTPATIENT)
Dept: LAB | Facility: LAB | Age: 75
End: 2025-01-03
Payer: MEDICARE

## 2025-01-03 DIAGNOSIS — D64.9 ANEMIA, UNSPECIFIED TYPE: ICD-10-CM

## 2025-01-03 DIAGNOSIS — E11.3553 TYPE 2 DIABETES MELLITUS WITH STABLE PROLIFERATIVE RETINOPATHY OF BOTH EYES, WITH LONG-TERM CURRENT USE OF INSULIN: ICD-10-CM

## 2025-01-03 DIAGNOSIS — Z79.4 TYPE 2 DIABETES MELLITUS WITH STABLE PROLIFERATIVE RETINOPATHY OF BOTH EYES, WITH LONG-TERM CURRENT USE OF INSULIN: ICD-10-CM

## 2025-01-03 DIAGNOSIS — E78.5 DYSLIPIDEMIA: ICD-10-CM

## 2025-01-03 DIAGNOSIS — I10 BENIGN ESSENTIAL HTN: ICD-10-CM

## 2025-01-03 LAB
ALBUMIN SERPL BCP-MCNC: 4.2 G/DL (ref 3.4–5)
ALP SERPL-CCNC: 70 U/L (ref 33–136)
ALT SERPL W P-5'-P-CCNC: 14 U/L (ref 7–45)
ANION GAP SERPL CALC-SCNC: 16 MMOL/L (ref 10–20)
AST SERPL W P-5'-P-CCNC: 19 U/L (ref 9–39)
BASOPHILS # BLD AUTO: 0.03 X10*3/UL (ref 0–0.1)
BASOPHILS NFR BLD AUTO: 0.5 %
BILIRUB SERPL-MCNC: 0.5 MG/DL (ref 0–1.2)
BUN SERPL-MCNC: 52 MG/DL (ref 6–23)
CALCIUM SERPL-MCNC: 9.5 MG/DL (ref 8.6–10.6)
CHLORIDE SERPL-SCNC: 104 MMOL/L (ref 98–107)
CHOLEST SERPL-MCNC: 198 MG/DL (ref 0–199)
CHOLESTEROL/HDL RATIO: 3.8
CO2 SERPL-SCNC: 24 MMOL/L (ref 21–32)
CREAT SERPL-MCNC: 2.06 MG/DL (ref 0.5–1.05)
EGFRCR SERPLBLD CKD-EPI 2021: 25 ML/MIN/1.73M*2
EOSINOPHIL # BLD AUTO: 0.12 X10*3/UL (ref 0–0.4)
EOSINOPHIL NFR BLD AUTO: 2 %
ERYTHROCYTE [DISTWIDTH] IN BLOOD BY AUTOMATED COUNT: 12.8 % (ref 11.5–14.5)
EST. AVERAGE GLUCOSE BLD GHB EST-MCNC: 171 MG/DL
FERRITIN SERPL-MCNC: 188 NG/ML (ref 8–150)
GLUCOSE SERPL-MCNC: 146 MG/DL (ref 74–99)
HBA1C MFR BLD: 7.6 %
HCT VFR BLD AUTO: 42.6 % (ref 36–46)
HDLC SERPL-MCNC: 52.2 MG/DL
HGB BLD-MCNC: 14 G/DL (ref 12–16)
IMM GRANULOCYTES # BLD AUTO: 0.01 X10*3/UL (ref 0–0.5)
IMM GRANULOCYTES NFR BLD AUTO: 0.2 % (ref 0–0.9)
IRON SATN MFR SERPL: 38 % (ref 25–45)
IRON SERPL-MCNC: 115 UG/DL (ref 35–150)
LDLC SERPL CALC-MCNC: 109 MG/DL
LYMPHOCYTES # BLD AUTO: 1.85 X10*3/UL (ref 0.8–3)
LYMPHOCYTES NFR BLD AUTO: 31.4 %
MCH RBC QN AUTO: 30.3 PG (ref 26–34)
MCHC RBC AUTO-ENTMCNC: 32.9 G/DL (ref 32–36)
MCV RBC AUTO: 92 FL (ref 80–100)
MONOCYTES # BLD AUTO: 0.38 X10*3/UL (ref 0.05–0.8)
MONOCYTES NFR BLD AUTO: 6.4 %
NEUTROPHILS # BLD AUTO: 3.51 X10*3/UL (ref 1.6–5.5)
NEUTROPHILS NFR BLD AUTO: 59.5 %
NON HDL CHOLESTEROL: 146 MG/DL (ref 0–149)
NRBC BLD-RTO: 0 /100 WBCS (ref 0–0)
PLATELET # BLD AUTO: 281 X10*3/UL (ref 150–450)
POTASSIUM SERPL-SCNC: 4.4 MMOL/L (ref 3.5–5.3)
PROT SERPL-MCNC: 7.4 G/DL (ref 6.4–8.2)
RBC # BLD AUTO: 4.62 X10*6/UL (ref 4–5.2)
SODIUM SERPL-SCNC: 140 MMOL/L (ref 136–145)
TIBC SERPL-MCNC: 306 UG/DL (ref 240–445)
TRIGL SERPL-MCNC: 186 MG/DL (ref 0–149)
UIBC SERPL-MCNC: 191 UG/DL (ref 110–370)
VLDL: 37 MG/DL (ref 0–40)
WBC # BLD AUTO: 5.9 X10*3/UL (ref 4.4–11.3)

## 2025-01-03 PROCEDURE — 83550 IRON BINDING TEST: CPT

## 2025-01-03 PROCEDURE — 83540 ASSAY OF IRON: CPT

## 2025-01-03 PROCEDURE — 80061 LIPID PANEL: CPT

## 2025-01-03 PROCEDURE — 80053 COMPREHEN METABOLIC PANEL: CPT

## 2025-01-03 PROCEDURE — 82728 ASSAY OF FERRITIN: CPT

## 2025-01-03 PROCEDURE — 83036 HEMOGLOBIN GLYCOSYLATED A1C: CPT

## 2025-01-03 PROCEDURE — 85025 COMPLETE CBC W/AUTO DIFF WBC: CPT

## 2025-01-07 ENCOUNTER — APPOINTMENT (OUTPATIENT)
Dept: PRIMARY CARE | Facility: CLINIC | Age: 75
End: 2025-01-07
Payer: MEDICARE

## 2025-01-07 ENCOUNTER — LAB (OUTPATIENT)
Dept: LAB | Facility: LAB | Age: 75
End: 2025-01-07
Payer: MEDICARE

## 2025-01-07 ENCOUNTER — PATIENT OUTREACH (OUTPATIENT)
Dept: PRIMARY CARE | Facility: CLINIC | Age: 75
End: 2025-01-07

## 2025-01-07 VITALS
TEMPERATURE: 97.6 F | HEIGHT: 61 IN | BODY MASS INDEX: 18.31 KG/M2 | OXYGEN SATURATION: 94 % | SYSTOLIC BLOOD PRESSURE: 112 MMHG | HEART RATE: 72 BPM | WEIGHT: 97 LBS | DIASTOLIC BLOOD PRESSURE: 72 MMHG

## 2025-01-07 DIAGNOSIS — I10 BENIGN ESSENTIAL HTN: ICD-10-CM

## 2025-01-07 DIAGNOSIS — I63.50 CEREBROVASCULAR ACCIDENT (CVA) DUE TO STENOSIS OF CEREBRAL ARTERY (MULTI): ICD-10-CM

## 2025-01-07 DIAGNOSIS — Z79.4 TYPE 2 DIABETES MELLITUS WITH STABLE PROLIFERATIVE RETINOPATHY OF BOTH EYES, WITH LONG-TERM CURRENT USE OF INSULIN: ICD-10-CM

## 2025-01-07 DIAGNOSIS — E11.3553 TYPE 2 DIABETES MELLITUS WITH STABLE PROLIFERATIVE RETINOPATHY OF BOTH EYES, WITH LONG-TERM CURRENT USE OF INSULIN: ICD-10-CM

## 2025-01-07 DIAGNOSIS — T88.59XA NAUSEA AND VOMITING AFTER ADMINISTRATION OF ANESTHETIC AGENT: ICD-10-CM

## 2025-01-07 DIAGNOSIS — E78.5 DYSLIPIDEMIA: ICD-10-CM

## 2025-01-07 DIAGNOSIS — N18.4 ANEMIA DUE TO STAGE 4 CHRONIC KIDNEY DISEASE (MULTI): ICD-10-CM

## 2025-01-07 DIAGNOSIS — K21.00 GASTROESOPHAGEAL REFLUX DISEASE WITH ESOPHAGITIS WITHOUT HEMORRHAGE: ICD-10-CM

## 2025-01-07 DIAGNOSIS — T78.1XXA GASTROINTESTINAL FOOD SENSITIVITY: Primary | ICD-10-CM

## 2025-01-07 DIAGNOSIS — N18.32 STAGE 3B CHRONIC KIDNEY DISEASE (MULTI): ICD-10-CM

## 2025-01-07 DIAGNOSIS — T78.1XXA GASTROINTESTINAL FOOD SENSITIVITY: ICD-10-CM

## 2025-01-07 DIAGNOSIS — R11.2 NAUSEA AND VOMITING AFTER ADMINISTRATION OF ANESTHETIC AGENT: ICD-10-CM

## 2025-01-07 DIAGNOSIS — D63.1 ANEMIA DUE TO STAGE 4 CHRONIC KIDNEY DISEASE (MULTI): ICD-10-CM

## 2025-01-07 PROBLEM — E11.319 DIABETIC RETINOPATHY (MULTI): Status: RESOLVED | Noted: 2023-01-23 | Resolved: 2025-01-07

## 2025-01-07 PROCEDURE — 86003 ALLG SPEC IGE CRUDE XTRC EA: CPT

## 2025-01-07 RX ORDER — ATORVASTATIN CALCIUM 20 MG/1
20 TABLET, FILM COATED ORAL DAILY
Qty: 90 TABLET | Refills: 1 | Status: SHIPPED | OUTPATIENT
Start: 2025-01-07

## 2025-01-07 RX ORDER — OMEPRAZOLE 20 MG/1
20 CAPSULE, DELAYED RELEASE ORAL
Qty: 90 CAPSULE | Refills: 1 | Status: SHIPPED | OUTPATIENT
Start: 2025-01-07 | End: 2025-07-06

## 2025-01-07 RX ORDER — FERROUS SULFATE 325(65) MG
325 TABLET, DELAYED RELEASE (ENTERIC COATED) ORAL
Qty: 180 TABLET | Refills: 1 | Status: SHIPPED | OUTPATIENT
Start: 2025-01-07 | End: 2026-01-07

## 2025-01-07 RX ORDER — LISINOPRIL 5 MG/1
5 TABLET ORAL 2 TIMES DAILY
Qty: 180 TABLET | Refills: 1 | Status: SHIPPED | OUTPATIENT
Start: 2025-01-07

## 2025-01-07 ASSESSMENT — ENCOUNTER SYMPTOMS
LOSS OF SENSATION IN FEET: 0
OCCASIONAL FEELINGS OF UNSTEADINESS: 0
DEPRESSION: 0

## 2025-01-07 NOTE — ASSESSMENT & PLAN NOTE
Patient continues to see nephrology at least twice yearly last labs were stable discussed hydration continue every 3-month blood work

## 2025-01-07 NOTE — PROGRESS NOTES
"Subjective   Patient ID: Janay Hilliard is a 74 y.o. female who presents for Follow-up (EP.  Follow up BP & weight.  Labs done.  Would like allergy tested worried allergic to whole wheat.  Would like to make dietary changes.).  HPI presents with her daughter Gregoria who is also with her historian.  Patient presents today for follow-up of hypertension diabetes hyperlipidemia vision loss history of TIA history of renal insufficiency.  She continues to see endocrinology and nephrology regularly.  She has routine ophthalmology follow-up.  She has been restricting her foods because she thinks she has multiple food allergies and her daughter discussed with me allergy testing.  We opted to do a RAST panel to start with.  They would like to try to increase her foods but she is resistant.  She denies high or low blood sugars currently no current dizziness although she does have this from time to time.  They can monitor her blood pressure at home and have not noticed any discrepancies at this time.  She denies chest pains or shortness of breath and she has no edema.  Patient does not drink fluids prior to her visits for labs and she is a very poor fluid drinker on a general basis.  She will drink some water if it has flavoring and it but she wondered if soup and tea and coffee would count as water intake.  Patient is does not often thirsty and does not really like regular water she is always been a poor water drinker  She has some memory issues no acute changes  She has some hearing loss and wears hearing aids  Review of Systems  Review of systems was performed and is otherwise negative except as noted in HPI.      Objective   /72   Pulse 72   Temp 36.4 °C (97.6 °F) (Oral)   Ht 1.549 m (5' 1\")   Wt (!) 44 kg (97 lb)   SpO2 94%   BMI 18.33 kg/m²      Physical Exam  HEENT is normal  Lungs clear bilaterally  Heart is regular rate rhythm no murmurs  Abdomen benign  Lower extremities no edema     Assessment/Plan   Diagnoses " and all orders for this visit:  Gastrointestinal food sensitivity  -     Food Allergy Profile IgE; Future  Cerebrovascular accident (CVA) due to stenosis of cerebral artery (Multi)  -     apixaban (Eliquis) 5 mg tablet; Take 1 tablet (5 mg) by mouth every 12 hours.  -     atorvastatin (Lipitor) 20 mg tablet; Take 1 tablet (20 mg) by mouth once daily.  Benign essential HTN  -     atorvastatin (Lipitor) 20 mg tablet; Take 1 tablet (20 mg) by mouth once daily.  -     lisinopril 5 mg tablet; Take 1 tablet (5 mg) by mouth 2 times a day.  Gastroesophageal reflux disease with esophagitis without hemorrhage  -     omeprazole (PriLOSEC) 20 mg DR capsule; Take 1 capsule (20 mg) by mouth once daily in the morning. Take before meals.  Anemia due to stage 4 chronic kidney disease (Multi)  -     ferrous sulfate 325 (65 Fe) MG EC tablet; Take 1 tablet by mouth 2 times a day after meals. Do not crush, chew, or split.  Nausea and vomiting after administration of anesthetic agent  -     Food Allergy Profile IgE; Future  Type 2 diabetes mellitus with stable proliferative retinopathy of both eyes, with long-term current use of insulin  -     Hemoglobin A1C; Future  -     Albumin-Creatinine Ratio, Urine Random; Future  Stage 3b chronic kidney disease (Multi)  -     Comprehensive Metabolic Panel; Future  Dyslipidemia  -     Lipid Panel; Future  -     Comprehensive Metabolic Panel; Future    Refills are sent  Food allergy testing is ordered  Call with issues  She is compliant with her meds  Continue to follow-up with endocrinology as well as nephrology  Blood work is ordered for her next appointment    Taylor Nieto MD

## 2025-01-07 NOTE — ASSESSMENT & PLAN NOTE
Patient's last labs were improved she continues to see endocrinology regularly labs are done every 3 months

## 2025-01-07 NOTE — PROGRESS NOTES
Chart reviewed prior to [facility] outreach. I spoke with patient and her daughter, Gregoria. Patient states she is doing well right now. Patient's daughter said she has been looking into assisted living facilities for the patient and she has been working with Marcellus from [facility]. Anitha said she has not heard from Marcellus in awhile so I will reach out to him and ask him to give her a call and tell him she is ready to make decisions. Patient's daughter said they are leaning more toward [facility] but want to look into the [place] as well. Patient has some concerns about assisted living with most of them having to do with losing a lot of her independence. We discussed this and talked about ways she can still be independent. We discussed her needing some additional support. Patient is agreeable but hesitant. I will reach out to Marcellus. Nothing else needed from me right now.

## 2025-01-08 LAB
CLAM IGE QN: <0.1 KU/L
CODFISH IGE QN: <0.1 KU/L
CORN IGE QN: <0.1
EGG WHITE IGE QN: <0.1 KU/L
MILK IGE QN: <0.1 KU/L
PEANUT IGE QN: <0.1 KU/L
SCALLOP IGE QN: <0.1 KU/L
SESAME SEED IGE QN: <0.1 KU/L
SHRIMP IGE QN: <0.1 KU/L
SOYBEAN IGE QN: <0.1 KU/L
WALNUT IGE QN: <0.1 KU/L
WHEAT IGE QN: <0.1 KU/L

## 2025-01-14 ENCOUNTER — APPOINTMENT (OUTPATIENT)
Dept: PODIATRY | Facility: CLINIC | Age: 75
End: 2025-01-14
Payer: MEDICARE

## 2025-01-14 DIAGNOSIS — M79.674 TOE PAIN, RIGHT: ICD-10-CM

## 2025-01-14 DIAGNOSIS — B35.1 TINEA UNGUIUM: ICD-10-CM

## 2025-01-14 DIAGNOSIS — M79.675 TOE PAIN, LEFT: ICD-10-CM

## 2025-01-14 DIAGNOSIS — E11.42 DIABETIC POLYNEUROPATHY ASSOCIATED WITH TYPE 2 DIABETES MELLITUS (MULTI): ICD-10-CM

## 2025-01-14 DIAGNOSIS — S90.219A SUBUNGUAL HEMATOMA OF GREAT TOE: Primary | ICD-10-CM

## 2025-01-14 PROCEDURE — 1036F TOBACCO NON-USER: CPT | Performed by: PODIATRIST

## 2025-01-14 PROCEDURE — 99212 OFFICE O/P EST SF 10 MIN: CPT | Performed by: PODIATRIST

## 2025-01-14 PROCEDURE — 1123F ACP DISCUSS/DSCN MKR DOCD: CPT | Performed by: PODIATRIST

## 2025-01-14 PROCEDURE — 1159F MED LIST DOCD IN RCRD: CPT | Performed by: PODIATRIST

## 2025-01-14 PROCEDURE — 3051F HG A1C>EQUAL 7.0%<8.0%: CPT | Performed by: PODIATRIST

## 2025-01-14 PROCEDURE — 4010F ACE/ARB THERAPY RXD/TAKEN: CPT | Performed by: PODIATRIST

## 2025-01-14 PROCEDURE — 3049F LDL-C 100-129 MG/DL: CPT | Performed by: PODIATRIST

## 2025-01-14 PROCEDURE — 11721 DEBRIDE NAIL 6 OR MORE: CPT | Performed by: PODIATRIST

## 2025-01-14 NOTE — PROGRESS NOTES
"CC: painful thickened and elongated toenails and diabetic care.      HPI: Pt presents for dm foot exam and painful thickened and elongated toenails that are difficult to manage.  Onset was gradual with worsening course until recently.  Aggravated by shoe gear and ambulation.  Hit her toe on the rollator, nail is discolored. It is growning out.        PCP: Dr. Nieto  Last visit: 1-7-25     PMH  Medical History           Past Medical History:   Diagnosis Date    Personal history of other diseases of the nervous system and sense organs 05/05/2021     History of hearing loss    Type 2 diabetes mellitus with diabetic chronic kidney disease (CMS/HCC) 03/26/2022     CKD stage 3 secondary to diabetes    Type 2 diabetes mellitus with diabetic chronic kidney disease (CMS/HCC) 04/13/2022     CKD stage 3 secondary to diabetes         MEDS     Current Outpatient Medications:     apixaban (Eliquis) 5 mg tablet, Take 1 tablet (5 mg) by mouth every 12 hours., Disp: 180 tablet, Rfl: 0    atorvastatin (Lipitor) 20 mg tablet, Take 1 tablet (20 mg) by mouth once daily., Disp: 90 tablet, Rfl: 0    cholecalciferol (Vitamin D-3) 25 MCG (1000 UT) tablet, Take by mouth., Disp: , Rfl:     diclofenac sodium 1 % kit, apply sparingly tto affected  area wice daiy as needed, Disp: , Rfl:     fluticasone (Flonase) 50 mcg/actuation nasal spray, Administer 1 spray into affected nostril(s)., Disp: , Rfl:     insulin aspart (NovoLOG) 100 unit/mL (3 mL) pen, Inject under the skin 3 times a day with meals. 9 units 5 units 9 units w meals, Disp: , Rfl:     lisinopril 5 mg tablet, Take 1 tablet (5 mg) by mouth 2 times a day. (Patient taking differently: Take 1 tablet (5 mg) by mouth once daily.), Disp: 180 tablet, Rfl: 1    omeprazole (PriLOSEC) 20 mg DR capsule, Take 1 capsule (20 mg) by mouth once daily in the morning. Take before meals., Disp: 90 capsule, Rfl: 0    pen needle, diabetic 32 gauge x 5/32\" needle, once daily. Use four pen needles every " day, Disp: , Rfl:     Restasis 0.05 % ophthalmic emulsion, INSTILL 1 DROP IN BOTH EYES TWICE DAILY, Disp: , Rfl:     Tresiba FlexTouch U-100 100 unit/mL (3 mL) injection, Inject 18 Units under the skin once daily in the morning., Disp: 3 mL, Rfl: 0  Allergies            Allergies   Allergen Reactions    Penicillins Cough       Happened when patient was a child    Sulfa (Sulfonamide Antibiotics) Rash      Social History   Social History                Socioeconomic History    Marital status:        Spouse name: Not on file    Number of children: Not on file    Years of education: Not on file    Highest education level: Not on file   Occupational History    Not on file   Tobacco Use    Smoking status: Never    Smokeless tobacco: Never   Vaping Use    Vaping Use: Never used   Substance and Sexual Activity    Alcohol use: Never    Drug use: Never    Sexual activity: Defer   Other Topics Concern    Not on file   Social History Narrative    Not on file      Social Determinants of Health      Financial Resource Strain: Not on file   Food Insecurity: Not on file   Transportation Needs: Not on file   Physical Activity: Not on file   Stress: Not on file   Social Connections: Not on file   Intimate Partner Violence: Not on file   Housing Stability: Not on file         Family History               Family History   Problem Relation Name Age of Onset    Diabetes Father        Diabetes Paternal Grandmother             Surgical History             Past Surgical History:   Procedure Laterality Date    OTHER SURGICAL HISTORY   2021     Tonsillectomy    OTHER SURGICAL HISTORY   2021      section    OTHER SURGICAL HISTORY   2021     Appendectomy            REVIEW OF SYSTEMS  DERM:   + as noted in HPI.         Physical examination:   On General Observation: Patient is a pleasant, cooperative, well developed 73 y.o. diabetic   adult female. The patient is alert and oriented to time, place and person.  Patient has normal affect and mood.  There were no vitals taken for this visit.     Vascular:  DP and PT pulses are 2/4 b/l.  mild edema noted. mild varicosities b/l.  CFT  6 seconds to all digits bilateral.  Skin temperature is warm to warm from proximal to distal bilateral.       Muscular: Strength is 5/5 for all instrinsic and extrinsic muscle groups.      Neuro:  Proprioception decreased.   Sensation to vibration is  decreased. Protective sensation decreased  at all pedal sites via [place] 5.07 monofilament bilateral.  Light touch present bilateral.      Derm:    Left toenails: 1-5 Brittleness, crumbling upon debridement, subungual debris, elongation, mycotic appearance, tenderness, and thickness.   Right toenails: 1-5 Brittleness, crumbling upon debridement, subungual debris, elongation, mycotic appearance, tenderness, and thickness.   Hair growth is decreased b/l le  hematoma is present right hallux, it is growing out distally, no odor or drainage is present     ASSESSMENT:    Subungual hematoma right hallux-resolving  Tinea Unguium [B35.1]   E11.42  Pain in right toe(s) [M79.674]   Pain in left toe(s) [M79.675]         PLAN:   Exam  Reviewed with pt, hematoma is dry and no infection or lysis of the nail, reviewed and will monitor the toe, it is growing out.  DM foot care and DM foot manifestations were reviewed.  The patient was educated on clinical findings, diagnosis and treatment plans. Patient understands all that has been explained and all questions were answered to apparent satisfaction.      - Debrided toenails 1-10 in length and height.   - Follow up in 9-12 weeks.         Haim Best DPM

## 2025-01-21 ENCOUNTER — HOSPITAL ENCOUNTER (OUTPATIENT)
Dept: CARDIOLOGY | Facility: CLINIC | Age: 75
Discharge: HOME | End: 2025-01-21
Payer: MEDICARE

## 2025-01-21 DIAGNOSIS — I35.9 AORTIC VALVE DISEASE: ICD-10-CM

## 2025-01-21 DIAGNOSIS — I10 BENIGN ESSENTIAL HTN: ICD-10-CM

## 2025-01-21 DIAGNOSIS — I63.9 CEREBROVASCULAR ACCIDENT (CVA), UNSPECIFIED MECHANISM (MULTI): ICD-10-CM

## 2025-01-21 DIAGNOSIS — R06.02 SHORTNESS OF BREATH ON EXERTION: ICD-10-CM

## 2025-01-21 DIAGNOSIS — I73.9 PERIPHERAL VASCULAR DISEASE, UNSPECIFIED (CMS-HCC): ICD-10-CM

## 2025-01-21 PROCEDURE — 93306 TTE W/DOPPLER COMPLETE: CPT | Performed by: STUDENT IN AN ORGANIZED HEALTH CARE EDUCATION/TRAINING PROGRAM

## 2025-01-21 PROCEDURE — 93306 TTE W/DOPPLER COMPLETE: CPT

## 2025-01-23 NOTE — PROGRESS NOTES
Subjective   Patient ID: Janay Hilliard is a 74 y.o. female who presents for No chief complaint on file..  HPI  This 74-year-old female with a history of type 2 diabetes, proliferative retinopathy of both eyes, history of peripheral vascular disease and secondary hyperparathyroidism is being seen in follow-up primarily on hearing.  She is also status post a CVA number of years ago.  She has also had past history of cerumen impaction further compromising hearing.  Review of Systems   A 12 point ROS  has been reviewed and are negative for complaint except for what is stated in the history of present illness and /or for past medical history as noted in the EMR.    Past Medical History:   Diagnosis Date    Cataract     Consumes two servings of caffeine per day     Pain of toe of left foot 01/16/2024    Pain of toe of right foot 01/16/2024          Current Outpatient Medications:     alendronate (Fosamax) 70 mg tablet, Take 1 tablet (70 mg) by mouth every 7 days. Take in the morning with a full glass of water, on an empty stomach, and do not take anything else by mouth or lie down for the next 30 min., Disp: , Rfl:     apixaban (Eliquis) 5 mg tablet, Take 1 tablet (5 mg) by mouth every 12 hours., Disp: 180 tablet, Rfl: 0    atorvastatin (Lipitor) 20 mg tablet, Take 1 tablet (20 mg) by mouth once daily., Disp: 90 tablet, Rfl: 1    Baqsimi 3 mg/actuation spray,non-aerosol, Use 1 Spray in the nose as needed. May repeat after 15 minutes using a new device if there is no response., Disp: , Rfl:     cholecalciferol (Vitamin D-3) 25 MCG (1000 UT) tablet, Take by mouth., Disp: , Rfl:     diclofenac sodium (Voltaren) 1 % gel, Apply 4.5 inches (4 g) topically 4 times a day as needed (pain)., Disp: 450 g, Rfl: 3    ferrous sulfate 325 (65 Fe) MG EC tablet, Take 1 tablet by mouth 2 times a day after meals. Do not crush, chew, or split., Disp: 180 tablet, Rfl: 1    fluticasone (Flonase) 50 mcg/actuation nasal spray, Administer 1 spray  "into affected nostril(s)., Disp: , Rfl:     Gvoke HypoPen 2-Pack 1 mg/0.2 mL auto-injector, Inject 1 mg under the skin., Disp: , Rfl:     ibandronate (Boniva) 150 mg tablet, Take 1 tablet (150 mg) by mouth every 30 (thirty) days. Take in morning with full glass of water on an empty stomach. No food, drink, meds, or lying down for 60 minutes after., Disp: , Rfl:     insulin aspart (NovoLOG) 100 unit/mL (3 mL) pen, Inject under the skin 3 times a day with meals. 9 units 5 units 9 units w meals, Disp: , Rfl:     insulin glargine (Lantus) 100 unit/mL (3 mL) pen, Inject 10 Units under the skin once daily in the morning., Disp: , Rfl:     insulin lispro (HumaLOG KwikPen Insulin) 100 unit/mL injection, Previously on 10 units at breakfast, 5-6 Units at lunch, 9 units at supper plus scale up to 30 units daily During hospitalization 5/9: was on 3u qAC with SSI.  Will need continued monitoring and adjustment., Disp: , Rfl:     lisinopril 5 mg tablet, Take 1 tablet (5 mg) by mouth 2 times a day., Disp: 180 tablet, Rfl: 1    omeprazole (PriLOSEC) 20 mg DR capsule, Take 1 capsule (20 mg) by mouth once daily in the morning. Take before meals., Disp: 90 capsule, Rfl: 1    oxymetazoline, PF, (Upneeq, PF,) 0.1 % dropperette, Administer 1 drop into affected eye(s)., Disp: , Rfl:     pen needle, diabetic 32 gauge x 5/32\" needle, once daily. Use four pen needles every day, Disp: , Rfl:     Restasis 0.05 % ophthalmic emulsion, INSTILL 1 DROP IN BOTH EYES TWICE DAILY, Disp: , Rfl:     Tresiba FlexTouch U-100 100 unit/mL (3 mL) injection, Inject 18 Units under the skin once daily in the morning., Disp: 3 mL, Rfl: 0     Allergies   Allergen Reactions    Penicillins Cough     Happened when patient was a child    Sulfa (Sulfonamide Antibiotics) Rash       There were no vitals taken for this visit.    Objective   Physical Exam  EXAMINATION:     GENERAL HERMINIA.EARANCE: Alert, in no acute distress, normal pitch and clarity of voice, well-developed " and nourished, cooperative.     HEAD/FACE: Normocephalic, atraumatic, normal facial movements and strength, no no tenderness to palpation, no lesions noted.     SKIN: Normal turgor, no raised or ulcerative lesions, warm and dry to palpation.     EYES: Weak eye movements were noted bilaterally. Visual acuity is weak.     EARS: Both ears--external ear anatomy is normal without lesions, auditory canals are patent and without skin abrasions or lesions, cerumen obstructs the left ear and is removed microscopically, hearing is intact to voice, tympanic membranes are intact with no acute inflammation, light reflexes present, no effusions are noted and no mastoid tenderness found to palpation.     NOSE: No external skin lesions are noted, nares are patent, septum is intact, sinuses are nontender to palpation bilaterally, no intranasal lesions or inflammation is noted, nasal valve is normal.     OROPHARYNX/ORAL CAVITY: Oropharynx is not inflamed and is without lesions, mucosa of the oral cavity is intact and without lesions, tongue is midline and mobile, no acute dental disease is noted, TMJs are mobile     NECK: No lymphadenopathy is palpated, carotid pulses are intact, neck is supple with full range of motion, no thyroid abnormalities are noted, trachea is midline, no neck masses are palpated.     LYMPHATICS: No cervical adenopathy or supraclavicular adenopathy is palpated.     NEUROLOGIC/PSYCH; alert and oriented, cranial nerves are grossly intact, gait is without falling, no motor deficits are noted.  Audiogram today in the right ear revealed evidence for a low normal hearing in the right ear from 250 Hz up through 750 Hz mild hearing loss at 125 Hz and 1000 Hz moderate hearing loss from 1500 Hz to 4000 Hz moderately severe in the upper frequencies of sound.  On the left-hand side there was a mild low-frequency hearing loss moderate from 750 Hz up through 4000 Hz moderately severe to severe in the upper frequencies of  sound.  Slight change at 250 Hz in the last in the left ear.  Discrimination scores 100% bilaterally at 60 dB on the right 65 on the left.  Tympanograms are normal      Assessment/Plan   Problem List Items Addressed This Visit             ICD-10-CM    COPD (chronic obstructive pulmonary disease) (Multi) J44.9    Stage 3b chronic kidney disease (Multi) N18.32    Bilateral sensorineural hearing loss - Primary H90.3    Tinnitus of both ears H93.13     Other Visit Diagnoses         Codes    Bilateral impacted cerumen     H61.23    History of hypertension     Z86.79    History of CVA (cerebrovascular accident)     Z86.73          I discussed the present hearing test findings with the patient. Since the last test there has been no significant change in the hearing of individual frequencies sound. Discrimination ability remains basically unchanged. It would be advised that a yearly audiogram be done unless symptoms develop in regards to progressive loss, new onset vertigo, or changes regarding tinnitus. Avoidance of loud noise without ear protection is advised. Rehabilitation using hearing aids is advised.  Does not appear that she is having any significant upper airway issues at this point.  As such is going to see audiology and be seen again in 1 year       Kalin Jacobson DMD, MD 01/23/25 1:15 PM

## 2025-01-26 LAB
AORTIC VALVE MEAN GRADIENT: 27 MMHG
AORTIC VALVE PEAK VELOCITY: 3.44 M/S
AV PEAK GRADIENT: 47 MMHG
AVA (PEAK VEL): 0.82 CM2
AVA (VTI): 0.85 CM2
EJECTION FRACTION APICAL 4 CHAMBER: 80.6
EJECTION FRACTION: 68 %
LEFT ATRIUM VOLUME AREA LENGTH INDEX BSA: 20.8 ML/M2
LEFT VENTRICLE INTERNAL DIMENSION DIASTOLE: 3.09 CM (ref 3.5–6)
LEFT VENTRICULAR OUTFLOW TRACT DIAMETER: 1.76 CM
MITRAL VALVE E/A RATIO: 0.66
RIGHT VENTRICLE FREE WALL PEAK S': 7 CM/S
RIGHT VENTRICLE PEAK SYSTOLIC PRESSURE: 26.2 MMHG
TRICUSPID ANNULAR PLANE SYSTOLIC EXCURSION: 1.4 CM

## 2025-01-27 ENCOUNTER — APPOINTMENT (OUTPATIENT)
Dept: AUDIOLOGY | Facility: CLINIC | Age: 75
End: 2025-01-27
Payer: MEDICARE

## 2025-01-27 ENCOUNTER — APPOINTMENT (OUTPATIENT)
Dept: OTOLARYNGOLOGY | Facility: CLINIC | Age: 75
End: 2025-01-27
Payer: MEDICARE

## 2025-01-27 VITALS
HEART RATE: 69 BPM | DIASTOLIC BLOOD PRESSURE: 72 MMHG | WEIGHT: 97.5 LBS | BODY MASS INDEX: 18.42 KG/M2 | SYSTOLIC BLOOD PRESSURE: 145 MMHG

## 2025-01-27 DIAGNOSIS — H90.3 BILATERAL SENSORINEURAL HEARING LOSS: Primary | ICD-10-CM

## 2025-01-27 DIAGNOSIS — H93.13 TINNITUS, BILATERAL: ICD-10-CM

## 2025-01-27 DIAGNOSIS — H90.3 SENSORINEURAL HEARING LOSS (SNHL) OF BOTH EARS: Primary | ICD-10-CM

## 2025-01-27 DIAGNOSIS — H61.23 BILATERAL IMPACTED CERUMEN: ICD-10-CM

## 2025-01-27 DIAGNOSIS — H93.13 TINNITUS OF BOTH EARS: ICD-10-CM

## 2025-01-27 DIAGNOSIS — J42 CHRONIC BRONCHITIS, UNSPECIFIED CHRONIC BRONCHITIS TYPE (MULTI): ICD-10-CM

## 2025-01-27 DIAGNOSIS — N18.32 STAGE 3B CHRONIC KIDNEY DISEASE (MULTI): ICD-10-CM

## 2025-01-27 DIAGNOSIS — Z86.79 HISTORY OF HYPERTENSION: ICD-10-CM

## 2025-01-27 DIAGNOSIS — Z86.73 HISTORY OF CVA (CEREBROVASCULAR ACCIDENT): ICD-10-CM

## 2025-01-27 PROCEDURE — 1036F TOBACCO NON-USER: CPT | Performed by: OTOLARYNGOLOGY

## 2025-01-27 PROCEDURE — 3077F SYST BP >= 140 MM HG: CPT | Performed by: OTOLARYNGOLOGY

## 2025-01-27 PROCEDURE — HRANC PR HEARING AID NO CHARGE: Performed by: AUDIOLOGIST

## 2025-01-27 PROCEDURE — 1123F ACP DISCUSS/DSCN MKR DOCD: CPT | Performed by: OTOLARYNGOLOGY

## 2025-01-27 PROCEDURE — 3078F DIAST BP <80 MM HG: CPT | Performed by: OTOLARYNGOLOGY

## 2025-01-27 PROCEDURE — 1160F RVW MEDS BY RX/DR IN RCRD: CPT | Performed by: OTOLARYNGOLOGY

## 2025-01-27 PROCEDURE — 92557 COMPREHENSIVE HEARING TEST: CPT | Performed by: AUDIOLOGIST

## 2025-01-27 PROCEDURE — 99214 OFFICE O/P EST MOD 30 MIN: CPT | Performed by: OTOLARYNGOLOGY

## 2025-01-27 PROCEDURE — 1159F MED LIST DOCD IN RCRD: CPT | Performed by: OTOLARYNGOLOGY

## 2025-01-27 PROCEDURE — 92567 TYMPANOMETRY: CPT | Performed by: AUDIOLOGIST

## 2025-01-27 NOTE — PROGRESS NOTES
COMPREHENSIVE AUDIOMETRIC EVALUATION      Name:  Janay Hilliard  :  1950  Age:  74 y.o.  Date of Evaluation:  25   Referring Provider:  Kalin Jacobson DMD, MD     History:  Ms. Hilliard was seen today for an evaluation of hearing accompanied by her daughter/POA. .  Patient reported bilateral tinnitus and concerns for decreased hearing sensitivity.  Please recall, patient has a known sensorineural hearing loss for which she utilizes hearing aids, bilaterally. When asked, patient denied otalgia    See audiometric evaluation at end of this report or scanned under media tab    OTOSCOPY:       Right Ear: Clear canal       Left Ear: Clear canal    226 Hz TYMPANOMETRY:       Right Ear: Type Ad: hypercompliant with normal peak pressure and ear canal volume       Left Ear: Type A: normal peak pressure, compliance, and ear canal volume, consistent with middle ear function within normal limits    AUDIOMETRIC EVALUATION (Phones):       Right Ear: Normal sloping to Severe, Sensorineural hearing loss                 Left Ear: Normal sloping to Severe, Sensorineural hearing loss           NOTE: Hearing sensitivity decreased in the left ear at 250 Hz as compared to most recent audiometric evaluation 2024  NOTE: Clinically significant asymmetry 750 - 1000 Hz consistent with previous evaluations    Test technique:  Standard Audiometry  Reliability:   good    SPEECH RECOGNITION THRESHOLD:       Right Ear:  30 dBHL in good agreement with PTA       Left Ear:  40 dBHL in good agreement with PTA    WORD RECOGNITION:       Right Ear:  100%  excellent (%) at elevated presentation level       Left Ear:   100%  excellent (%) at elevated presentation level    DISCUSSION:   Discussed results and recommendations with patient and caregiver.  Questions were addressed and the patient was encouraged to contact our department should concerns arise.    RECOMMENDATIONS:  -Recommend patient continue consistent utilization of  hearing aids and follow up with audiologist.  -Recommend patient return for repeated audiometric evaluation should concerns for changes in hearing sensitivity arise or as medically indicated.    Reina Villarreal, CCC-A     Appt: 1:00 - 1:30 PM

## 2025-01-27 NOTE — PROGRESS NOTES
[facility]  HEARING AID CHECK      Name:  Janay Hilliard  YOB: 1950  Today's date:  01/27/25      PATIENT ISSUES/CONCERNS AND OTOSCOPIC RESULTS  Otoscopic was clear following Dr. Jacobson visit.  Patient reports that the hearing aids have been working well.    HEARING AID INSPECTION AND ADJUSTMENT  Hearing aids were cleaned and checked.  Replaced filters and cleaned vents.  Listening check was good.       Patient reports no issues or concerns.  No need for adjustment today.    Discussed warranty expiration.  Patient declined to extend at this time.    FOLLOW UP   The patient was asked to contact the office if they notice any significant change in hearing level or hearing aid function.    Otherwise, will follow up again in 1 year in conjunction with Dr. Jacobson visit.    APPOINTMENT TIME  2:00pm-2:30pm    Reina Perera  Doctor of Audiology  Senior Audiologist

## 2025-02-03 PROBLEM — E16.0: Status: ACTIVE | Noted: 2024-11-10

## 2025-02-03 PROBLEM — I35.0 AORTIC STENOSIS: Status: ACTIVE | Noted: 2024-11-08

## 2025-02-03 PROBLEM — I35.9 AORTIC VALVE DISEASE: Status: RESOLVED | Noted: 2023-01-23 | Resolved: 2025-02-03

## 2025-02-04 ENCOUNTER — APPOINTMENT (OUTPATIENT)
Dept: HEMATOLOGY/ONCOLOGY | Facility: CLINIC | Age: 75
End: 2025-02-04
Payer: MEDICARE

## 2025-02-04 ENCOUNTER — PATIENT OUTREACH (OUTPATIENT)
Dept: PRIMARY CARE | Facility: CLINIC | Age: 75
End: 2025-02-04
Payer: MEDICARE

## 2025-02-04 DIAGNOSIS — Z79.4 TYPE 2 DIABETES MELLITUS WITH STABLE PROLIFERATIVE RETINOPATHY OF BOTH EYES, WITH LONG-TERM CURRENT USE OF INSULIN: ICD-10-CM

## 2025-02-04 DIAGNOSIS — E11.3553 TYPE 2 DIABETES MELLITUS WITH STABLE PROLIFERATIVE RETINOPATHY OF BOTH EYES, WITH LONG-TERM CURRENT USE OF INSULIN: ICD-10-CM

## 2025-02-04 DIAGNOSIS — I10 BENIGN ESSENTIAL HTN: ICD-10-CM

## 2025-02-04 NOTE — PROGRESS NOTES
Chart reviewed prior to [facility] outreach. I spoke with the patient's daughter, Gregoria. She said the patient is doing well. She has decided she is going to move the patient to [facility]. They are touring it tomorrow but [facility] said she can move in soon. She thinks this will be best for the patient. The patient has done some things at home that cause concern and she can no longer live on her own. Gregoria did say the patient's lease is not up until this summer so they will need a letter from Dr. Nieto saying she is moving for medical reasons. I will let Dr. Nieto know.

## 2025-02-25 ENCOUNTER — OFFICE VISIT (OUTPATIENT)
Dept: CARDIOLOGY | Facility: CLINIC | Age: 75
End: 2025-02-25
Payer: MEDICARE

## 2025-02-25 VITALS
HEART RATE: 90 BPM | DIASTOLIC BLOOD PRESSURE: 57 MMHG | HEIGHT: 61 IN | OXYGEN SATURATION: 98 % | BODY MASS INDEX: 18.42 KG/M2 | SYSTOLIC BLOOD PRESSURE: 99 MMHG

## 2025-02-25 DIAGNOSIS — I10 BENIGN ESSENTIAL HYPERTENSION: ICD-10-CM

## 2025-02-25 DIAGNOSIS — I35.0 NONRHEUMATIC AORTIC VALVE STENOSIS: Primary | ICD-10-CM

## 2025-02-25 DIAGNOSIS — I10 BENIGN ESSENTIAL HTN: ICD-10-CM

## 2025-02-25 PROCEDURE — 1036F TOBACCO NON-USER: CPT | Performed by: STUDENT IN AN ORGANIZED HEALTH CARE EDUCATION/TRAINING PROGRAM

## 2025-02-25 PROCEDURE — 3078F DIAST BP <80 MM HG: CPT | Performed by: STUDENT IN AN ORGANIZED HEALTH CARE EDUCATION/TRAINING PROGRAM

## 2025-02-25 PROCEDURE — 99214 OFFICE O/P EST MOD 30 MIN: CPT | Performed by: STUDENT IN AN ORGANIZED HEALTH CARE EDUCATION/TRAINING PROGRAM

## 2025-02-25 PROCEDURE — 3074F SYST BP LT 130 MM HG: CPT | Performed by: STUDENT IN AN ORGANIZED HEALTH CARE EDUCATION/TRAINING PROGRAM

## 2025-02-25 PROCEDURE — 1159F MED LIST DOCD IN RCRD: CPT | Performed by: STUDENT IN AN ORGANIZED HEALTH CARE EDUCATION/TRAINING PROGRAM

## 2025-02-25 PROCEDURE — 1123F ACP DISCUSS/DSCN MKR DOCD: CPT | Performed by: STUDENT IN AN ORGANIZED HEALTH CARE EDUCATION/TRAINING PROGRAM

## 2025-02-25 RX ORDER — LISINOPRIL 5 MG/1
5 TABLET ORAL DAILY
Qty: 30 TABLET | Refills: 11 | Status: SHIPPED | OUTPATIENT
Start: 2025-02-25 | End: 2026-02-20

## 2025-02-25 NOTE — PROGRESS NOTES
"Chief Complaint:   Follow-up     History Of Present Illness:    Janay Hilliard is a 74 y.o. female return to clinic for yearly appointment.  She is fairly stable from a baseline standpoint.  Has been issues with her blood sugars and will is unclear if patient to take her medication properly as per daughter.  Patient denies chest pain but has some chronic shortness of breath with exertion.  She has mild dizziness.  Blood pressure today is 99/57.  Echo from January of this year showed EF of 65 to 70% moderate severe mitral valve calcification moderate aortic valve stenosis mild to moderate AI normal RVSP.     Last Recorded Vitals:  Vitals:    02/25/25 1151   BP: 99/57   BP Location: Left arm   Patient Position: Sitting   BP Cuff Size: Adult   Pulse: 90   SpO2: 98%   Height: 1.549 m (5' 1\")       Review of Systems  ROS      Allergies:  Penicillins and Sulfa (sulfonamide antibiotics)    Outpatient Medications:  Current Outpatient Medications   Medication Instructions    alendronate (FOSAMAX) 70 mg, Every 7 days    apixaban (ELIQUIS) 5 mg, oral, Every 12 hours    atorvastatin (LIPITOR) 20 mg, oral, Daily    Baqsimi 3 mg/actuation spray,non-aerosol Use 1 Spray in the nose as needed. May repeat after 15 minutes using a new device if there is no response.    cholecalciferol (Vitamin D-3) 25 MCG (1000 UT) tablet Take by mouth.    diclofenac sodium (VOLTAREN) 4 g, Topical, 4 times daily PRN    ferrous sulfate 325 (65 Fe) MG EC tablet 1 tablet, oral, 2 times daily after meals, Do not crush, chew, or split.    fluticasone (Flonase) 50 mcg/actuation nasal spray 1 spray    Gvoke HypoPen 2-Pack 1 mg    ibandronate (BONIVA) 150 mg, Every 30 days    insulin aspart (NovoLOG) 100 unit/mL (3 mL) pen 3 times daily (morning, midday, late afternoon)    insulin glargine (LANTUS) 10 Units, Every morning    insulin lispro (HumaLOG KwikPen Insulin) 100 unit/mL injection Previously on 10 units at breakfast, 5-6 Units at lunch, 9 units at supper " "plus scale up to 30 units daily  During hospitalization 5/9: was on 3u qAC with SSI.  Will need continued monitoring and adjustment.    lisinopril 5 mg, oral, Daily    omeprazole (PRILOSEC) 20 mg, oral, Daily before breakfast    oxymetazoline, PF, (Upneeq, PF,) 0.1 % dropperette 1 drop    pen needle, diabetic 32 gauge x 5/32\" needle Daily    Restasis 0.05 % ophthalmic emulsion INSTILL 1 DROP IN BOTH EYES TWICE DAILY    Tresiba FlexTouch U-100 18 Units, subcutaneous, Every morning       Physical Exam:  General: No acute distress,  A&O x3  Skin: Warm and dry  Neck: JVD is not elevated  ENT: Moist mucous membranes no lesions appreciated  Pulmonary: CTAB  Cards: Regular rate rhythm, 3 out of 6 to 4 out of 6 crescendo decrescendo murmur noted across aortic band which is mid-to-late peaking diminished S2.  No gallops or rubs appreciated normal S1-S2  Abdomen: Soft nontender nondistended  Extremities: No edema or cyanosis  Psych: Appropriate mood and affect        Last Labs:  CBC -  Lab Results   Component Value Date    WBC 5.9 01/03/2025    HGB 14.0 01/03/2025    HCT 42.6 01/03/2025    MCV 92 01/03/2025     01/03/2025       CMP -  Lab Results   Component Value Date    CALCIUM 9.5 01/03/2025    PHOS 4.5 10/10/2024    PROT 7.4 01/03/2025    ALBUMIN 4.2 01/03/2025    AST 19 01/03/2025    ALT 14 01/03/2025    ALKPHOS 70 01/03/2025    BILITOT 0.5 01/03/2025       LIPID PANEL -   Lab Results   Component Value Date    CHOL 198 01/03/2025    TRIG 186 (H) 01/03/2025    HDL 52.2 01/03/2025    CHHDL 3.8 01/03/2025    LDLF 81 09/21/2023    VLDL 37 01/03/2025    NHDL 146 01/03/2025       RENAL FUNCTION PANEL -   Lab Results   Component Value Date    GLUCOSE 146 (H) 01/03/2025     01/03/2025    K 4.4 01/03/2025     01/03/2025    CO2 24 01/03/2025    ANIONGAP 16 01/03/2025    BUN 52 (H) 01/03/2025    CREATININE 2.06 (H) 01/03/2025    CALCIUM 9.5 01/03/2025    PHOS 4.5 10/10/2024    ALBUMIN 4.2 01/03/2025        Lab " Results   Component Value Date    HGBA1C 7.6 (H) 01/03/2025       Last Cardiology Tests:  ECG:  No results found for this or any previous visit (from the past 4464 hours).     Echo:  No results found for this or any previous visit from the past 1095 days.       Ejection Fractions:  EF   Date/Time Value Ref Range Status   01/21/2025 12:30 PM 68 %      Assessment and Plan    1. Moderate aortic valve stenosis: Concomitant mild to moderate AI.  No significant change in symptoms.  Physical exam consistent with moderate to severe aortic valve stenosis.  Repeat echocardiogram 1 year for assessment.     2. Hyperlipidemia: Well-controlled with cholesterol therapy     3. Use of anticoagulation: Patient's previously had CVA events with no reports of atrial fibrillation coagulopathies or DVTs. She is on Eliquis for unknown reasons. She is to discuss with her neurologist whether or not this is truly indicated. If not there is no active cardiovascular reason from our standpoint to continue Eliquis.      4.  Hypertension: Blood pressures are low today.  Reduce lisinopril to 5 mg once a day.    Follow-up in 1 year     (This note was generated with voice recognition software and may contain errors including spelling, grammar, syntax and missed recognition of what was dictated, of which may not have been fully corrected)     Herbert Pretty MD PhD

## 2025-02-27 ENCOUNTER — APPOINTMENT (OUTPATIENT)
Dept: HEMATOLOGY/ONCOLOGY | Facility: CLINIC | Age: 75
End: 2025-02-27
Payer: MEDICARE

## 2025-02-28 ENCOUNTER — LAB (OUTPATIENT)
Dept: LAB | Facility: CLINIC | Age: 75
End: 2025-02-28
Payer: MEDICARE

## 2025-02-28 ENCOUNTER — OFFICE VISIT (OUTPATIENT)
Dept: HEMATOLOGY/ONCOLOGY | Facility: CLINIC | Age: 75
End: 2025-02-28
Payer: MEDICARE

## 2025-02-28 VITALS
SYSTOLIC BLOOD PRESSURE: 172 MMHG | BODY MASS INDEX: 18.7 KG/M2 | OXYGEN SATURATION: 99 % | RESPIRATION RATE: 16 BRPM | TEMPERATURE: 97.7 F | DIASTOLIC BLOOD PRESSURE: 78 MMHG | HEART RATE: 74 BPM | WEIGHT: 95.24 LBS | HEIGHT: 60 IN

## 2025-02-28 DIAGNOSIS — D47.2 MONOCLONAL (M) PROTEIN DISEASE, MULTIPLE 'M' PROTEIN: ICD-10-CM

## 2025-02-28 DIAGNOSIS — D47.2 MONOCLONAL (M) PROTEIN DISEASE, MULTIPLE 'M' PROTEIN: Primary | ICD-10-CM

## 2025-02-28 LAB
ALBUMIN SERPL BCP-MCNC: 4.7 G/DL (ref 3.4–5)
ALP SERPL-CCNC: 71 U/L (ref 33–136)
ALT SERPL W P-5'-P-CCNC: 21 U/L (ref 7–45)
ANION GAP SERPL CALC-SCNC: 14 MMOL/L (ref 10–20)
AST SERPL W P-5'-P-CCNC: 30 U/L (ref 9–39)
BASOPHILS # BLD AUTO: 0.02 X10*3/UL (ref 0–0.1)
BASOPHILS NFR BLD AUTO: 0.3 %
BILIRUB SERPL-MCNC: 0.5 MG/DL (ref 0–1.2)
BUN SERPL-MCNC: 47 MG/DL (ref 6–23)
CALCIUM SERPL-MCNC: 9.8 MG/DL (ref 8.6–10.6)
CHLORIDE SERPL-SCNC: 104 MMOL/L (ref 98–107)
CO2 SERPL-SCNC: 24 MMOL/L (ref 21–32)
CREAT SERPL-MCNC: 1.66 MG/DL (ref 0.5–1.05)
EGFRCR SERPLBLD CKD-EPI 2021: 32 ML/MIN/1.73M*2
EOSINOPHIL # BLD AUTO: 0.12 X10*3/UL (ref 0–0.4)
EOSINOPHIL NFR BLD AUTO: 2.1 %
ERYTHROCYTE [DISTWIDTH] IN BLOOD BY AUTOMATED COUNT: 12.8 % (ref 11.5–14.5)
GLUCOSE SERPL-MCNC: 205 MG/DL (ref 74–99)
HCT VFR BLD AUTO: 37.8 % (ref 36–46)
HGB BLD-MCNC: 13 G/DL (ref 12–16)
IMM GRANULOCYTES # BLD AUTO: 0 X10*3/UL (ref 0–0.5)
IMM GRANULOCYTES NFR BLD AUTO: 0 % (ref 0–0.9)
LYMPHOCYTES # BLD AUTO: 1.68 X10*3/UL (ref 0.8–3)
LYMPHOCYTES NFR BLD AUTO: 29.3 %
MCH RBC QN AUTO: 31.3 PG (ref 26–34)
MCHC RBC AUTO-ENTMCNC: 34.4 G/DL (ref 32–36)
MCV RBC AUTO: 91 FL (ref 80–100)
MONOCYTES # BLD AUTO: 0.44 X10*3/UL (ref 0.05–0.8)
MONOCYTES NFR BLD AUTO: 7.7 %
NEUTROPHILS # BLD AUTO: 3.48 X10*3/UL (ref 1.6–5.5)
NEUTROPHILS NFR BLD AUTO: 60.6 %
NRBC BLD-RTO: NORMAL /100{WBCS}
PLATELET # BLD AUTO: 191 X10*3/UL (ref 150–450)
POTASSIUM SERPL-SCNC: 5 MMOL/L (ref 3.5–5.3)
PROT SERPL-MCNC: 8.1 G/DL (ref 6.4–8.2)
PROT SERPL-MCNC: 8.1 G/DL (ref 6.4–8.2)
RBC # BLD AUTO: 4.16 X10*6/UL (ref 4–5.2)
SODIUM SERPL-SCNC: 137 MMOL/L (ref 136–145)
WBC # BLD AUTO: 5.7 X10*3/UL (ref 4.4–11.3)

## 2025-02-28 PROCEDURE — 80053 COMPREHEN METABOLIC PANEL: CPT

## 2025-02-28 PROCEDURE — 84155 ASSAY OF PROTEIN SERUM: CPT | Mod: 59

## 2025-02-28 PROCEDURE — 1123F ACP DISCUSS/DSCN MKR DOCD: CPT | Performed by: INTERNAL MEDICINE

## 2025-02-28 PROCEDURE — 3077F SYST BP >= 140 MM HG: CPT | Performed by: INTERNAL MEDICINE

## 2025-02-28 PROCEDURE — 86334 IMMUNOFIX E-PHORESIS SERUM: CPT

## 2025-02-28 PROCEDURE — 99215 OFFICE O/P EST HI 40 MIN: CPT | Mod: 25 | Performed by: INTERNAL MEDICINE

## 2025-02-28 PROCEDURE — 1126F AMNT PAIN NOTED NONE PRSNT: CPT | Performed by: INTERNAL MEDICINE

## 2025-02-28 PROCEDURE — 36415 COLL VENOUS BLD VENIPUNCTURE: CPT

## 2025-02-28 PROCEDURE — 3078F DIAST BP <80 MM HG: CPT | Performed by: INTERNAL MEDICINE

## 2025-02-28 PROCEDURE — 85025 COMPLETE CBC W/AUTO DIFF WBC: CPT

## 2025-02-28 PROCEDURE — 83521 IG LIGHT CHAINS FREE EACH: CPT

## 2025-02-28 PROCEDURE — 99205 OFFICE O/P NEW HI 60 MIN: CPT | Performed by: INTERNAL MEDICINE

## 2025-02-28 ASSESSMENT — PAIN SCALES - GENERAL: PAINLEVEL_OUTOF10: 0-NO PAIN

## 2025-02-28 NOTE — PROGRESS NOTES
"Patient ID: Janay Hilliard is a 74 y.o. female.  Referring Physician: No referring provider defined for this encounter.  Primary Care Provider: Taylor Nieto MD      Subjective    HPI    Review of Systems - Oncology     Objective   BSA: 1.35 meters squared  /78 Comment: manual  Pulse 74   Temp 36.5 °C (97.7 °F) (Temporal)   Resp 16   Ht 1.522 m (4' 11.92\")   Wt (!) 43.2 kg (95 lb 3.8 oz)   SpO2 99%   BMI 18.65 kg/m²     Family History   Problem Relation Name Age of Onset    Alcohol abuse Mother      Hypertension Father      Arthritis Father      Diabetes Father      Gout Father      Arthritis Sister      Hypertension Daughter      Diabetes Paternal Grandmother       Oncology History    No history exists.       Janay Hilliard  reports that she quit smoking about 31 years ago. Her smoking use included cigarettes. She has never used smokeless tobacco.  She  reports no history of alcohol use.  She  reports no history of drug use.    Physical Exam    Performance Status:  {ECOG performance status:04176}    Assessment/Plan          {Assess/PlanSmartLinks:96481}           Raghu Gamble MD                         "

## 2025-02-28 NOTE — PROGRESS NOTES
"Patient seen by Dr. Mendes today in clinic. Reinforcement education provided regarding next steps with plan of care.      PER DR. MENDES'S FUV NOTE TODAY: \"Patient is a 74-year-old woman with past medical history ofhypertension, COPD, diabetes, CVA, vision loss, arthritis, chronic kidney disease, diverticulosis, GERD and hearing loss was referred for further evaluation and management of M protein. Physical examination revealed a thin frail woman but no other abnormalities. Had a detailed discussion with the patient explained to her about significance of M protein which exists in 3 out of 100 people above the age of 70 popularly called as MGUS. It is also associated sometimes with multiple myeloma which can cause osteolytic lesions in the bone marrow. I have recommended further evaluation with CBC, CMP, iron indicis, vitamin B12 levels, serum protein electrophoresis, immunoelectrophoresis and a skeletal survey. The patient understood appreciated all the details provided and was grateful.\"    Pt going for skeletal survey [facility], copy of order provided.  Will return for blood work at [facility].  FUV 4 weeks. Patient verbalizes understanding of plan of care via teachback method.             "

## 2025-02-28 NOTE — PROGRESS NOTES
"Patient ID: Janay Hilliard is a 74 y.o. female.  Referring Physician: No referring provider defined for this encounter.  Primary Care Provider: Taylor Nieto MD  Visit Type: Initial Visit  The patient was referred to me for further evaluation and management of monoclonal protein.    Subjective    HPI  The patient is a 74-year-old woman with past medical history of hypertension, COPD, diabetes, CVA, vision loss, arthritis, chronic kidney disease, diverticulosis, GERD and hearing loss was referred for further evaluation and management of M protein at 0.1 g/dL.    At interview on 2025 the patient was accompanied by her daughter the patient denied a history of weight loss, fevers, night sweats, constipation, confusion, chest pain, shortness of breath, nausea, vomiting, hematemesis, melena, hematochezia and hematuria.    Past medical history:    hypertension, COPD, diabetes, CVA, vision loss, arthritis, chronic kidney disease, diverticulosis, GERD and hearing loss    Past surgical history:    Appendectomy, , tonsillectomy:    Never had a mammogram    Never had a colonoscopy.    Family history reviewed but not relevant.    Personal history and social history:    74 years old, has 2 children lives in assisted living no history of smoking alcohol abuse drug abuse  Review of Systems   All other systems reviewed and are negative.       Objective   BSA: 1.35 meters squared  /78 Comment: manual  Pulse 74   Temp 36.5 °C (97.7 °F) (Temporal)   Resp 16   Ht 1.522 m (4' 11.92\")   Wt (!) 43.2 kg (95 lb 3.8 oz)   SpO2 99%   BMI 18.65 kg/m²      has a past medical history of Cataract, Consumes two servings of caffeine per day, Pain of toe of left foot (2024), and Pain of toe of right foot (2024).   has a past surgical history that includes Tonsillectomy (2021);  section, low transverse; Appendectomy; and Eye surgery.  Family History   Problem Relation Name Age of Onset    " Alcohol abuse Mother      Hypertension Father      Arthritis Father      Diabetes Father      Gout Father      Arthritis Sister      Hypertension Daughter      Diabetes Paternal Grandmother       Oncology History    No history exists.       Janay Hilliard  reports that she quit smoking about 31 years ago. Her smoking use included cigarettes. She has never used smokeless tobacco.  She  reports no history of alcohol use.  She  reports no history of drug use.    Physical Exam  Constitutional:       Appearance: Normal appearance.   HENT:      Head: Normocephalic and atraumatic.      Nose: Nose normal.      Mouth/Throat:      Mouth: Mucous membranes are moist.      Pharynx: Oropharynx is clear.   Eyes:      Extraocular Movements: Extraocular movements intact.      Conjunctiva/sclera: Conjunctivae normal.      Pupils: Pupils are equal, round, and reactive to light.   Cardiovascular:      Rate and Rhythm: Normal rate and regular rhythm.   Pulmonary:      Effort: Pulmonary effort is normal.      Breath sounds: Normal breath sounds.   Abdominal:      General: Abdomen is flat. Bowel sounds are normal.      Palpations: Abdomen is soft.   Musculoskeletal:         General: Normal range of motion.      Cervical back: Normal range of motion and neck supple.   Neurological:      General: No focal deficit present.      Mental Status: She is alert and oriented to person, place, and time. Mental status is at baseline.   Psychiatric:         Mood and Affect: Mood normal.         Behavior: Behavior normal.         Thought Content: Thought content normal.         Judgment: Judgment normal.         WBC   Date/Time Value Ref Range Status   01/03/2025 09:30 AM 5.9 4.4 - 11.3 x10*3/uL Final   09/26/2024 09:13 AM 5.4 4.4 - 11.3 x10*3/uL Final   06/03/2024 12:12 PM 6.0 4.4 - 11.3 x10*3/uL Final     nRBC   Date Value Ref Range Status   01/03/2025 0.0 0.0 - 0.0 /100 WBCs Final   09/26/2024 0.0 0.0 - 0.0 /100 WBCs Final   06/03/2024 0.0 0.0 - 0.0  "/100 WBCs Final     RBC   Date Value Ref Range Status   01/03/2025 4.62 4.00 - 5.20 x10*6/uL Final   09/26/2024 4.08 4.00 - 5.20 x10*6/uL Final   06/03/2024 3.88 (L) 4.00 - 5.20 x10*6/uL Final     Hemoglobin   Date Value Ref Range Status   01/03/2025 14.0 12.0 - 16.0 g/dL Final   09/26/2024 12.6 12.0 - 16.0 g/dL Final   06/03/2024 11.7 (L) 12.0 - 16.0 g/dL Final     Hematocrit   Date Value Ref Range Status   01/03/2025 42.6 36.0 - 46.0 % Final   09/26/2024 38.5 36.0 - 46.0 % Final   06/03/2024 35.1 (L) 36.0 - 46.0 % Final     MCV   Date/Time Value Ref Range Status   01/03/2025 09:30 AM 92 80 - 100 fL Final   09/26/2024 09:13 AM 94 80 - 100 fL Final   06/03/2024 12:12 PM 91 80 - 100 fL Final     MCH   Date/Time Value Ref Range Status   01/03/2025 09:30 AM 30.3 26.0 - 34.0 pg Final   09/26/2024 09:13 AM 30.9 26.0 - 34.0 pg Final   06/03/2024 12:12 PM 30.2 26.0 - 34.0 pg Final     MCHC   Date/Time Value Ref Range Status   01/03/2025 09:30 AM 32.9 32.0 - 36.0 g/dL Final   09/26/2024 09:13 AM 32.7 32.0 - 36.0 g/dL Final   06/03/2024 12:12 PM 33.3 32.0 - 36.0 g/dL Final     RDW   Date/Time Value Ref Range Status   01/03/2025 09:30 AM 12.8 11.5 - 14.5 % Final   09/26/2024 09:13 AM 12.6 11.5 - 14.5 % Final   06/03/2024 12:12 PM 14.3 11.5 - 14.5 % Final     Platelets   Date/Time Value Ref Range Status   01/03/2025 09:30  150 - 450 x10*3/uL Final   09/26/2024 09:13  150 - 450 x10*3/uL Final   06/03/2024 12:12  150 - 450 x10*3/uL Final     No results found for: \"MPV\"  Neutrophils %   Date/Time Value Ref Range Status   01/03/2025 09:30 AM 59.5 40.0 - 80.0 % Final   09/26/2024 09:13 AM 50.4 40.0 - 80.0 % Final   06/03/2024 12:12 PM 68.3 40.0 - 80.0 % Final     Immature Granulocytes %, Automated   Date/Time Value Ref Range Status   01/03/2025 09:30 AM 0.2 0.0 - 0.9 % Final     Comment:     Immature Granulocyte Count (IG) includes promyelocytes, myelocytes and metamyelocytes but does not include bands. Percent " differential counts (%) should be interpreted in the context of the absolute cell counts (cells/UL).   09/26/2024 09:13 AM 0.2 0.0 - 0.9 % Final     Comment:     Immature Granulocyte Count (IG) includes promyelocytes, myelocytes and metamyelocytes but does not include bands. Percent differential counts (%) should be interpreted in the context of the absolute cell counts (cells/UL).   06/03/2024 12:12 PM 0.3 0.0 - 0.9 % Final     Comment:     Immature Granulocyte Count (IG) includes promyelocytes, myelocytes and metamyelocytes but does not include bands. Percent differential counts (%) should be interpreted in the context of the absolute cell counts (cells/UL).     Lymphocytes %   Date/Time Value Ref Range Status   01/03/2025 09:30 AM 31.4 13.0 - 44.0 % Final   09/26/2024 09:13 AM 37.5 13.0 - 44.0 % Final   06/03/2024 12:12 PM 21.0 13.0 - 44.0 % Final     Monocytes %   Date/Time Value Ref Range Status   01/03/2025 09:30 AM 6.4 2.0 - 10.0 % Final   09/26/2024 09:13 AM 8.7 2.0 - 10.0 % Final   06/03/2024 12:12 PM 7.7 2.0 - 10.0 % Final     Eosinophils %   Date/Time Value Ref Range Status   01/03/2025 09:30 AM 2.0 0.0 - 6.0 % Final   09/26/2024 09:13 AM 2.6 0.0 - 6.0 % Final   06/03/2024 12:12 PM 2.4 0.0 - 6.0 % Final     Basophils %   Date/Time Value Ref Range Status   01/03/2025 09:30 AM 0.5 0.0 - 2.0 % Final   09/26/2024 09:13 AM 0.6 0.0 - 2.0 % Final   06/03/2024 12:12 PM 0.3 0.0 - 2.0 % Final     Neutrophils Absolute   Date/Time Value Ref Range Status   01/03/2025 09:30 AM 3.51 1.60 - 5.50 x10*3/uL Final     Comment:     Percent differential counts (%) should be interpreted in the context of the absolute cell counts (cells/uL).   09/26/2024 09:13 AM 2.73 1.60 - 5.50 x10*3/uL Final     Comment:     Percent differential counts (%) should be interpreted in the context of the absolute cell counts (cells/uL).   06/03/2024 12:12 PM 4.06 1.60 - 5.50 x10*3/uL Final     Comment:     Percent differential counts (%) should be  "interpreted in the context of the absolute cell counts (cells/uL).     Immature Granulocytes Absolute, Automated   Date/Time Value Ref Range Status   01/03/2025 09:30 AM 0.01 0.00 - 0.50 x10*3/uL Final   09/26/2024 09:13 AM 0.01 0.00 - 0.50 x10*3/uL Final   06/03/2024 12:12 PM 0.02 0.00 - 0.50 x10*3/uL Final     Lymphocytes Absolute   Date/Time Value Ref Range Status   01/03/2025 09:30 AM 1.85 0.80 - 3.00 x10*3/uL Final   09/26/2024 09:13 AM 2.03 0.80 - 3.00 x10*3/uL Final   06/03/2024 12:12 PM 1.25 0.80 - 3.00 x10*3/uL Final     Monocytes Absolute   Date/Time Value Ref Range Status   01/03/2025 09:30 AM 0.38 0.05 - 0.80 x10*3/uL Final   09/26/2024 09:13 AM 0.47 0.05 - 0.80 x10*3/uL Final   06/03/2024 12:12 PM 0.46 0.05 - 0.80 x10*3/uL Final     Eosinophils Absolute   Date/Time Value Ref Range Status   01/03/2025 09:30 AM 0.12 0.00 - 0.40 x10*3/uL Final   09/26/2024 09:13 AM 0.14 0.00 - 0.40 x10*3/uL Final   06/03/2024 12:12 PM 0.14 0.00 - 0.40 x10*3/uL Final     Basophils Absolute   Date/Time Value Ref Range Status   01/03/2025 09:30 AM 0.03 0.00 - 0.10 x10*3/uL Final   09/26/2024 09:13 AM 0.03 0.00 - 0.10 x10*3/uL Final   06/03/2024 12:12 PM 0.02 0.00 - 0.10 x10*3/uL Final       No components found for: \"PT\"  No results found for: \"APTT\"    Assessment/Plan    Patient is a 74-year-old woman with past medical history ofhypertension, COPD, diabetes, CVA, vision loss, arthritis, chronic kidney disease, diverticulosis, GERD and hearing loss was referred for further evaluation and management of M protein.  Physical examination revealed a thin frail woman but no other abnormalities.  Had a detailed discussion with the patient explained to her about significance of M protein which exists in 3 out of 100 people above the age of 70 popularly called as MGUS.  It is also associated sometimes with multiple myeloma which can cause osteolytic lesions in the bone marrow.  I have recommended further evaluation with CBC, CMP, iron " indicis, vitamin B12 levels, serum protein electrophoresis, immunoelectrophoresis and a skeletal survey.  The patient understood appreciated all the details provided and was grateful.    Thank you for allowing me to participate in care of your patient if you have any questions please feel free to call me     Diagnoses and all orders for this visit:  Monoclonal (M) protein disease, multiple 'M' protein  -     CBC and Auto Differential; Future  -     Comprehensive Metabolic Panel; Future  -     [place]/Lambda Free Light Chain, Serum; Future  -     Serum Protein Electrophoresis + Immunofixation; Future  -     XR skeletal survey complete; Future  -     Clinic Appointment Request; Future         Raghu Gamble MD

## 2025-03-04 LAB
ALBUMIN: 4.7 G/DL (ref 3.4–5)
ALPHA 1 GLOBULIN: 0.3 G/DL (ref 0.2–0.6)
ALPHA 2 GLOBULIN: 0.9 G/DL (ref 0.4–1.1)
BETA GLOBULIN: 0.8 G/DL (ref 0.5–1.2)
GAMMA GLOBULIN: 1.4 G/DL (ref 0.5–1.4)
IMMUNOFIXATION COMMENT: NORMAL
PATH REVIEW - SERUM IMMUNOFIXATION: NORMAL
PATH REVIEW-SERUM PROTEIN ELECTROPHORESIS: NORMAL
PROTEIN ELECTROPHORESIS COMMENT: NORMAL

## 2025-03-07 LAB
KAPPA LC SERPL-MCNC: 4.69 MG/DL (ref 0.33–1.94)
KAPPA LC/LAMBDA SER: 0.78 {RATIO} (ref 0.26–1.65)
LAMBDA LC SERPL-MCNC: 6.02 MG/DL (ref 0.57–2.63)

## 2025-03-10 ENCOUNTER — HOSPITAL ENCOUNTER (OUTPATIENT)
Dept: RADIOLOGY | Facility: CLINIC | Age: 75
Discharge: HOME | End: 2025-03-10
Payer: MEDICARE

## 2025-03-10 DIAGNOSIS — D47.2 MONOCLONAL (M) PROTEIN DISEASE, MULTIPLE 'M' PROTEIN: ICD-10-CM

## 2025-03-10 PROCEDURE — 77075 RADEX OSSEOUS SURVEY COMPL: CPT | Performed by: RADIOLOGY

## 2025-03-10 PROCEDURE — 77075 RADEX OSSEOUS SURVEY COMPL: CPT

## 2025-03-20 ENCOUNTER — PATIENT OUTREACH (OUTPATIENT)
Dept: PRIMARY CARE | Facility: CLINIC | Age: 75
End: 2025-03-20
Payer: MEDICARE

## 2025-03-20 DIAGNOSIS — E11.3553 TYPE 2 DIABETES MELLITUS WITH STABLE PROLIFERATIVE RETINOPATHY OF BOTH EYES, WITH LONG-TERM CURRENT USE OF INSULIN: ICD-10-CM

## 2025-03-20 DIAGNOSIS — I10 BENIGN ESSENTIAL HTN: ICD-10-CM

## 2025-03-20 DIAGNOSIS — Z79.4 TYPE 2 DIABETES MELLITUS WITH STABLE PROLIFERATIVE RETINOPATHY OF BOTH EYES, WITH LONG-TERM CURRENT USE OF INSULIN: ICD-10-CM

## 2025-03-20 NOTE — PROGRESS NOTES
Care Management Monthly Outreach.   Chart reviewed prior to patient outreach.  Last Office Visit: 1/7/25  Next Office Visit: 4/15/25  Next Specialist Appt: HemOnc 3/28       Are there medication changes since last visit? No  Refills needed? No    Subjective     I spoke to the patient's daughter, Gregoria. She said the patient was able to get moved in to [facility]. She thinks this was the best thing for the patient and says the patient is doing really well there. It forces her to get out and socialize when she goes to the diner for meals which has been really beneficial for the patient. The patient's daughter said they are still trying to figure out which services the patient needs so she does not currently have very many added services.     Patient is going to keep Dr. Nieto as her PCP. She has an appointment with her on 4/15. The daughter is aware of this.     Care Gaps   You are overdue or due soon for the following Care Gaps:    Health Maintenance Due   Topic Date Due    Hepatitis C Screening  Never done    Hepatitis A Vaccine (1 of 2 - Risk 2-dose series) Never done    Pneumococcal Vaccination (1 of 2 - PCV) Never done    DTaP/Tdap/Td Vaccines (1 - Tdap) Never done    Hepatitis B Vaccine (1 of 3 - Risk 3-dose series) Never done         Objective     Last filed Vital Signs:    BP Readings from Last 6 Encounters:   02/28/25 172/78   02/25/25 99/57   01/27/25 145/72   01/07/25 112/72   09/30/24 116/68   07/26/24 118/66        Wt Readings from Last 6 Encounters:   02/28/25 (!) 43.2 kg (95 lb 3.8 oz)   01/27/25 (!) 44.2 kg (97 lb 8 oz)   01/07/25 (!) 44 kg (97 lb)   09/30/24 (!) 42.2 kg (93 lb)   09/24/24 (!) 44.5 kg (98 lb)   07/26/24 (!) 43.5 kg (96 lb)       BMI Readings from Last 6 Encounters:   02/28/25 18.65 kg/m²   02/25/25 18.42 kg/m²   01/27/25 18.42 kg/m²   01/07/25 18.33 kg/m²   09/30/24 17.57 kg/m²   09/24/24 18.52 kg/m²       Lab Review  Lab Results   Component Value Date    BILITOT 0.5 02/28/2025     CALCIUM 9.8 02/28/2025    CO2 24 02/28/2025     02/28/2025    CREATININE 1.66 (H) 02/28/2025    GLUCOSE 205 (H) 02/28/2025    ALKPHOS 71 02/28/2025    K 5.0 02/28/2025    PROT 8.1 02/28/2025    PROT 8.1 02/28/2025     02/28/2025    AST 30 02/28/2025    ALT 21 02/28/2025    BUN 47 (H) 02/28/2025    ANIONGAP 14 02/28/2025    PHOS 4.5 10/10/2024    ALBUMIN 4.7 02/28/2025    GFRF 38 (A) 09/21/2023     Lab Results   Component Value Date    TRIG 186 (H) 01/03/2025    CHOL 198 01/03/2025    LDLCALC 109 (H) 01/03/2025    HDL 52.2 01/03/2025     Lab Results   Component Value Date    HGBA1C 7.7 (A) 02/26/2025    HGBA1C 7.6 (H) 01/03/2025    HGBA1C 8.1 (H) 11/09/2024         Nothing else needed at this time.    Tam Patrick BSN, RN, [facility]  Chronic Care Management  [phone]

## 2025-03-28 ENCOUNTER — OFFICE VISIT (OUTPATIENT)
Dept: HEMATOLOGY/ONCOLOGY | Facility: CLINIC | Age: 75
End: 2025-03-28
Payer: MEDICARE

## 2025-03-28 VITALS
WEIGHT: 96.78 LBS | BODY MASS INDEX: 18.95 KG/M2 | TEMPERATURE: 98.1 F | RESPIRATION RATE: 16 BRPM | DIASTOLIC BLOOD PRESSURE: 82 MMHG | SYSTOLIC BLOOD PRESSURE: 162 MMHG | OXYGEN SATURATION: 99 % | HEART RATE: 94 BPM

## 2025-03-28 DIAGNOSIS — D47.2 MONOCLONAL (M) PROTEIN DISEASE, MULTIPLE 'M' PROTEIN: ICD-10-CM

## 2025-03-28 PROCEDURE — 99214 OFFICE O/P EST MOD 30 MIN: CPT | Performed by: INTERNAL MEDICINE

## 2025-03-28 PROCEDURE — 3079F DIAST BP 80-89 MM HG: CPT | Performed by: INTERNAL MEDICINE

## 2025-03-28 PROCEDURE — 1126F AMNT PAIN NOTED NONE PRSNT: CPT | Performed by: INTERNAL MEDICINE

## 2025-03-28 PROCEDURE — 3077F SYST BP >= 140 MM HG: CPT | Performed by: INTERNAL MEDICINE

## 2025-03-28 PROCEDURE — 1159F MED LIST DOCD IN RCRD: CPT | Performed by: INTERNAL MEDICINE

## 2025-03-28 PROCEDURE — 1123F ACP DISCUSS/DSCN MKR DOCD: CPT | Performed by: INTERNAL MEDICINE

## 2025-03-28 ASSESSMENT — PAIN SCALES - GENERAL: PAINLEVEL_OUTOF10: 0-NO PAIN

## 2025-03-28 NOTE — PROGRESS NOTES
Patient ID: Janay Hilliard is a 74 y.o. female.  Referring Physician: Raghu Gamble MD  [address] [facility], [place] [address]  Primary Care Provider: Taylor Nieto MD  Visit Type: Initial Visit  The patient was referred to me for further evaluation and management of monoclonal protein.    Skeletal survey on March 10, 2025 did not reveal any osteolytic lesions.  Subjective    HPI  The patient is a 74-year-old woman with past medical history of hypertension, COPD, diabetes, CVA, vision loss, arthritis, chronic kidney disease, diverticulosis, GERD and hearing loss was referred for further evaluation and management of M protein at 0.1 g/dL.    At interview on 2025 the patient was accompanied by her daughter the patient denied a history of weight loss, fevers, night sweats, constipation, confusion, chest pain, shortness of breath, nausea, vomiting, hematemesis, melena, hematochezia and hematuria.    The patient and family had come for follow-up on 2025 regarding history of M protein at 0.1 g/dL.  The patient is asymptomatic.    Past medical history:    hypertension, COPD, diabetes, CVA, vision loss, arthritis, chronic kidney disease, diverticulosis, GERD and hearing loss    Past surgical history:    Appendectomy, , tonsillectomy:    Never had a mammogram    Never had a colonoscopy.    Family history reviewed but not relevant.    Personal history and social history:    74 years old, has 2 children lives in assisted living no history of smoking alcohol abuse drug abuse  Review of Systems   All other systems reviewed and are negative.       Objective   BSA: 1.36 meters squared  /82   Pulse 94   Temp 36.7 °C (98.1 °F) (Temporal)   Resp 16   Wt (!) 43.9 kg (96 lb 12.5 oz)   SpO2 99%   BMI 18.95 kg/m²      has a past medical history of Cataract, Consumes two servings of caffeine per day, Pain of toe of left foot (2024), and Pain of toe of right foot  (2024).   has a past surgical history that includes Tonsillectomy (2021);  section, low transverse; Appendectomy; and Eye surgery.  Family History   Problem Relation Name Age of Onset    Alcohol abuse Mother      Hypertension Father      Arthritis Father      Diabetes Father      Gout Father      Arthritis Sister      Hypertension Daughter      Diabetes Paternal Grandmother       Oncology History    No history exists.       Janay Hilliard  reports that she quit smoking about 31 years ago. Her smoking use included cigarettes. She has never used smokeless tobacco.  She  reports no history of alcohol use.  She  reports no history of drug use.    Physical Exam  Constitutional:       Appearance: Normal appearance.   HENT:      Head: Normocephalic and atraumatic.      Nose: Nose normal.      Mouth/Throat:      Mouth: Mucous membranes are moist.      Pharynx: Oropharynx is clear.   Eyes:      Extraocular Movements: Extraocular movements intact.      Conjunctiva/sclera: Conjunctivae normal.      Pupils: Pupils are equal, round, and reactive to light.   Cardiovascular:      Rate and Rhythm: Normal rate and regular rhythm.   Pulmonary:      Effort: Pulmonary effort is normal.      Breath sounds: Normal breath sounds.   Abdominal:      General: Abdomen is flat. Bowel sounds are normal.      Palpations: Abdomen is soft.   Musculoskeletal:         General: Normal range of motion.      Cervical back: Normal range of motion and neck supple.   Neurological:      General: No focal deficit present.      Mental Status: She is alert and oriented to person, place, and time. Mental status is at baseline.   Psychiatric:         Mood and Affect: Mood normal.         Behavior: Behavior normal.         Thought Content: Thought content normal.         Judgment: Judgment normal.         WBC   Date/Time Value Ref Range Status   2025 01:26 PM 5.7 4.4 - 11.3 x10*3/uL Final   2025 09:30 AM 5.9 4.4 - 11.3 x10*3/uL Final  "  09/26/2024 09:13 AM 5.4 4.4 - 11.3 x10*3/uL Final     nRBC   Date Value Ref Range Status   02/28/2025   Final     Comment:     Not Measured   01/03/2025 0.0 0.0 - 0.0 /100 WBCs Final   09/26/2024 0.0 0.0 - 0.0 /100 WBCs Final     RBC   Date Value Ref Range Status   02/28/2025 4.16 4.00 - 5.20 x10*6/uL Final   01/03/2025 4.62 4.00 - 5.20 x10*6/uL Final   09/26/2024 4.08 4.00 - 5.20 x10*6/uL Final     Hemoglobin   Date Value Ref Range Status   02/28/2025 13.0 12.0 - 16.0 g/dL Final   01/03/2025 14.0 12.0 - 16.0 g/dL Final   09/26/2024 12.6 12.0 - 16.0 g/dL Final     Hematocrit   Date Value Ref Range Status   02/28/2025 37.8 36.0 - 46.0 % Final   01/03/2025 42.6 36.0 - 46.0 % Final   09/26/2024 38.5 36.0 - 46.0 % Final     MCV   Date/Time Value Ref Range Status   02/28/2025 01:26 PM 91 80 - 100 fL Final   01/03/2025 09:30 AM 92 80 - 100 fL Final   09/26/2024 09:13 AM 94 80 - 100 fL Final     MCH   Date/Time Value Ref Range Status   02/28/2025 01:26 PM 31.3 26.0 - 34.0 pg Final   01/03/2025 09:30 AM 30.3 26.0 - 34.0 pg Final   09/26/2024 09:13 AM 30.9 26.0 - 34.0 pg Final     MCHC   Date/Time Value Ref Range Status   02/28/2025 01:26 PM 34.4 32.0 - 36.0 g/dL Final   01/03/2025 09:30 AM 32.9 32.0 - 36.0 g/dL Final   09/26/2024 09:13 AM 32.7 32.0 - 36.0 g/dL Final     RDW   Date/Time Value Ref Range Status   02/28/2025 01:26 PM 12.8 11.5 - 14.5 % Final   01/03/2025 09:30 AM 12.8 11.5 - 14.5 % Final   09/26/2024 09:13 AM 12.6 11.5 - 14.5 % Final     Platelets   Date/Time Value Ref Range Status   02/28/2025 01:26  150 - 450 x10*3/uL Final   01/03/2025 09:30  150 - 450 x10*3/uL Final   09/26/2024 09:13  150 - 450 x10*3/uL Final     No results found for: \"MPV\"  Neutrophils %   Date/Time Value Ref Range Status   02/28/2025 01:26 PM 60.6 40.0 - 80.0 % Final   01/03/2025 09:30 AM 59.5 40.0 - 80.0 % Final   09/26/2024 09:13 AM 50.4 40.0 - 80.0 % Final     Immature Granulocytes %, Automated   Date/Time Value " Ref Range Status   02/28/2025 01:26 PM 0.0 0.0 - 0.9 % Final     Comment:     Immature Granulocyte Count (IG) includes promyelocytes, myelocytes and metamyelocytes but does not include bands. Percent differential counts (%) should be interpreted in the context of the absolute cell counts (cells/UL).   01/03/2025 09:30 AM 0.2 0.0 - 0.9 % Final     Comment:     Immature Granulocyte Count (IG) includes promyelocytes, myelocytes and metamyelocytes but does not include bands. Percent differential counts (%) should be interpreted in the context of the absolute cell counts (cells/UL).   09/26/2024 09:13 AM 0.2 0.0 - 0.9 % Final     Comment:     Immature Granulocyte Count (IG) includes promyelocytes, myelocytes and metamyelocytes but does not include bands. Percent differential counts (%) should be interpreted in the context of the absolute cell counts (cells/UL).     Lymphocytes %   Date/Time Value Ref Range Status   02/28/2025 01:26 PM 29.3 13.0 - 44.0 % Final   01/03/2025 09:30 AM 31.4 13.0 - 44.0 % Final   09/26/2024 09:13 AM 37.5 13.0 - 44.0 % Final     Monocytes %   Date/Time Value Ref Range Status   02/28/2025 01:26 PM 7.7 2.0 - 10.0 % Final   01/03/2025 09:30 AM 6.4 2.0 - 10.0 % Final   09/26/2024 09:13 AM 8.7 2.0 - 10.0 % Final     Eosinophils %   Date/Time Value Ref Range Status   02/28/2025 01:26 PM 2.1 0.0 - 6.0 % Final   01/03/2025 09:30 AM 2.0 0.0 - 6.0 % Final   09/26/2024 09:13 AM 2.6 0.0 - 6.0 % Final     Basophils %   Date/Time Value Ref Range Status   02/28/2025 01:26 PM 0.3 0.0 - 2.0 % Final   01/03/2025 09:30 AM 0.5 0.0 - 2.0 % Final   09/26/2024 09:13 AM 0.6 0.0 - 2.0 % Final     Neutrophils Absolute   Date/Time Value Ref Range Status   02/28/2025 01:26 PM 3.48 1.60 - 5.50 x10*3/uL Final     Comment:     Percent differential counts (%) should be interpreted in the context of the absolute cell counts (cells/uL).   01/03/2025 09:30 AM 3.51 1.60 - 5.50 x10*3/uL Final     Comment:     Percent differential  "counts (%) should be interpreted in the context of the absolute cell counts (cells/uL).   09/26/2024 09:13 AM 2.73 1.60 - 5.50 x10*3/uL Final     Comment:     Percent differential counts (%) should be interpreted in the context of the absolute cell counts (cells/uL).     Immature Granulocytes Absolute, Automated   Date/Time Value Ref Range Status   02/28/2025 01:26 PM 0.00 0.00 - 0.50 x10*3/uL Final   01/03/2025 09:30 AM 0.01 0.00 - 0.50 x10*3/uL Final   09/26/2024 09:13 AM 0.01 0.00 - 0.50 x10*3/uL Final     Lymphocytes Absolute   Date/Time Value Ref Range Status   02/28/2025 01:26 PM 1.68 0.80 - 3.00 x10*3/uL Final   01/03/2025 09:30 AM 1.85 0.80 - 3.00 x10*3/uL Final   09/26/2024 09:13 AM 2.03 0.80 - 3.00 x10*3/uL Final     Monocytes Absolute   Date/Time Value Ref Range Status   02/28/2025 01:26 PM 0.44 0.05 - 0.80 x10*3/uL Final   01/03/2025 09:30 AM 0.38 0.05 - 0.80 x10*3/uL Final   09/26/2024 09:13 AM 0.47 0.05 - 0.80 x10*3/uL Final     Eosinophils Absolute   Date/Time Value Ref Range Status   02/28/2025 01:26 PM 0.12 0.00 - 0.40 x10*3/uL Final   01/03/2025 09:30 AM 0.12 0.00 - 0.40 x10*3/uL Final   09/26/2024 09:13 AM 0.14 0.00 - 0.40 x10*3/uL Final     Basophils Absolute   Date/Time Value Ref Range Status   02/28/2025 01:26 PM 0.02 0.00 - 0.10 x10*3/uL Final   01/03/2025 09:30 AM 0.03 0.00 - 0.10 x10*3/uL Final   09/26/2024 09:13 AM 0.03 0.00 - 0.10 x10*3/uL Final       No components found for: \"PT\"  No results found for: \"APTT\"    Assessment/Plan    Patient is a 74-year-old woman with past medical history ofhypertension, COPD, diabetes, CVA, vision loss, arthritis, chronic kidney disease, diverticulosis, GERD and hearing loss was referred for further evaluation and management of M protein.  Physical examination revealed a thin frail woman but no other abnormalities.  Had a detailed discussion with the patient explained to her about significance of M protein which exists in 3 out of 100 people above the age " of 70 popularly called as MGUS.  It is also associated sometimes with multiple myeloma which can cause osteolytic lesions in the bone marrow.  I have recommended further evaluation with CBC, CMP, iron indicis, vitamin B12 levels, serum protein electrophoresis, immunoelectrophoresis and a skeletal survey.  The patient understood appreciated all the details provided and was grateful.    The patient and family had come for follow-up on March 28, 2025 regarding history of M protein at 0.1 g/dL.  The patient is asymptomatic.  Skeletal survey on March 10, 2025 did not reveal any evidence of osteolytic/sclerotic lesions.  Suggesting diagnosis of MGUS.  Follow clinically.  Return in 6 months.    Thank you for allowing me to participate in care of your patient if you have any questions please feel free to call me     Diagnoses and all orders for this visit:  Monoclonal (M) protein disease, multiple 'M' protein  -     CBC and Auto Differential; Future  -     Comprehensive Metabolic Panel; Future  -     [place]/Lambda Free Light Chain, Serum; Future  -     Serum Protein Electrophoresis + Immunofixation; Future  -     XR skeletal survey complete; Future  -     Clinic Appointment Request; Future         Raghu Gamble MD

## 2025-03-28 NOTE — PROGRESS NOTES
Patient for follow up visit in 6 months.  Patient independently ambulatory off unit in NAD and without complaints.  Gait steady.  Call back instructions reviewed.  Patient and daughter verbalized understanding.

## 2025-04-12 LAB
ALBUMIN SERPL-MCNC: 4.4 G/DL (ref 3.6–5.1)
ALBUMIN/CREAT UR: 1057 MG/G CREAT
ALP SERPL-CCNC: 62 U/L (ref 37–153)
ALT SERPL-CCNC: 18 U/L (ref 6–29)
ANION GAP SERPL CALCULATED.4IONS-SCNC: 9 MMOL/L (CALC) (ref 7–17)
AST SERPL-CCNC: 23 U/L (ref 10–35)
BASOPHILS # BLD AUTO: 32 CELLS/UL (ref 0–200)
BASOPHILS NFR BLD AUTO: 0.6 %
BILIRUB SERPL-MCNC: 0.4 MG/DL (ref 0.2–1.2)
BUN SERPL-MCNC: 39 MG/DL (ref 7–25)
CALCIUM SERPL-MCNC: 9.2 MG/DL (ref 8.6–10.4)
CHLORIDE SERPL-SCNC: 109 MMOL/L (ref 98–110)
CHOLEST SERPL-MCNC: 174 MG/DL
CHOLEST/HDLC SERPL: 2.7 (CALC)
CO2 SERPL-SCNC: 21 MMOL/L (ref 20–32)
CREAT SERPL-MCNC: 1.68 MG/DL (ref 0.6–1)
CREAT UR-MCNC: 49 MG/DL (ref 20–275)
EGFRCR SERPLBLD CKD-EPI 2021: 32 ML/MIN/1.73M2
EOSINOPHIL # BLD AUTO: 130 CELLS/UL (ref 15–500)
EOSINOPHIL NFR BLD AUTO: 2.4 %
ERYTHROCYTE [DISTWIDTH] IN BLOOD BY AUTOMATED COUNT: 13 % (ref 11–15)
EST. AVERAGE GLUCOSE BLD GHB EST-MCNC: 197 MG/DL
EST. AVERAGE GLUCOSE BLD GHB EST-SCNC: 10.9 MMOL/L
GLUCOSE SERPL-MCNC: 309 MG/DL (ref 65–99)
HBA1C MFR BLD: 8.5 % OF TOTAL HGB
HCT VFR BLD AUTO: 39.5 % (ref 35–45)
HDLC SERPL-MCNC: 64 MG/DL
HGB BLD-MCNC: 13 G/DL (ref 11.7–15.5)
LDLC SERPL CALC-MCNC: 95 MG/DL (CALC)
LYMPHOCYTES # BLD AUTO: 1571 CELLS/UL (ref 850–3900)
LYMPHOCYTES NFR BLD AUTO: 29.1 %
MCH RBC QN AUTO: 31.6 PG (ref 27–33)
MCHC RBC AUTO-ENTMCNC: 32.9 G/DL (ref 32–36)
MCV RBC AUTO: 96.1 FL (ref 80–100)
MICROALBUMIN UR-MCNC: 51.8 MG/DL
MONOCYTES # BLD AUTO: 535 CELLS/UL (ref 200–950)
MONOCYTES NFR BLD AUTO: 9.9 %
NEUTROPHILS # BLD AUTO: 3132 CELLS/UL (ref 1500–7800)
NEUTROPHILS NFR BLD AUTO: 58 %
NONHDLC SERPL-MCNC: 110 MG/DL (CALC)
PLATELET # BLD AUTO: 222 THOUSAND/UL (ref 140–400)
PMV BLD REES-ECKER: 9.5 FL (ref 7.5–12.5)
POTASSIUM SERPL-SCNC: 4.6 MMOL/L (ref 3.5–5.3)
PROT SERPL-MCNC: 7.5 G/DL (ref 6.1–8.1)
RBC # BLD AUTO: 4.11 MILLION/UL (ref 3.8–5.1)
SODIUM SERPL-SCNC: 139 MMOL/L (ref 135–146)
TRIGL SERPL-MCNC: 61 MG/DL
TSH SERPL-ACNC: 2.27 MIU/L (ref 0.4–4.5)
WBC # BLD AUTO: 5.4 THOUSAND/UL (ref 3.8–10.8)

## 2025-04-15 ENCOUNTER — APPOINTMENT (OUTPATIENT)
Dept: PRIMARY CARE | Facility: CLINIC | Age: 75
End: 2025-04-15
Payer: MEDICARE

## 2025-04-15 ENCOUNTER — PATIENT OUTREACH (OUTPATIENT)
Dept: PRIMARY CARE | Facility: CLINIC | Age: 75
End: 2025-04-15

## 2025-04-15 VITALS
DIASTOLIC BLOOD PRESSURE: 68 MMHG | HEART RATE: 78 BPM | BODY MASS INDEX: 19.56 KG/M2 | TEMPERATURE: 97.8 F | WEIGHT: 97 LBS | HEIGHT: 59 IN | OXYGEN SATURATION: 95 % | SYSTOLIC BLOOD PRESSURE: 138 MMHG

## 2025-04-15 DIAGNOSIS — J42 CHRONIC BRONCHITIS, UNSPECIFIED CHRONIC BRONCHITIS TYPE (MULTI): ICD-10-CM

## 2025-04-15 DIAGNOSIS — N18.32 STAGE 3B CHRONIC KIDNEY DISEASE (MULTI): ICD-10-CM

## 2025-04-15 DIAGNOSIS — N18.4 ANEMIA DUE TO STAGE 4 CHRONIC KIDNEY DISEASE: ICD-10-CM

## 2025-04-15 DIAGNOSIS — I10 BENIGN ESSENTIAL HYPERTENSION: Primary | ICD-10-CM

## 2025-04-15 DIAGNOSIS — Z79.4 TYPE 2 DIABETES MELLITUS WITH STABLE PROLIFERATIVE RETINOPATHY OF BOTH EYES, WITH LONG-TERM CURRENT USE OF INSULIN: ICD-10-CM

## 2025-04-15 DIAGNOSIS — E11.3553 TYPE 2 DIABETES MELLITUS WITH STABLE PROLIFERATIVE RETINOPATHY OF BOTH EYES, WITH LONG-TERM CURRENT USE OF INSULIN: ICD-10-CM

## 2025-04-15 DIAGNOSIS — I10 BENIGN ESSENTIAL HTN: ICD-10-CM

## 2025-04-15 DIAGNOSIS — D63.1 ANEMIA DUE TO STAGE 4 CHRONIC KIDNEY DISEASE: ICD-10-CM

## 2025-04-15 DIAGNOSIS — I63.50 CEREBROVASCULAR ACCIDENT (CVA) DUE TO STENOSIS OF CEREBRAL ARTERY (MULTI): ICD-10-CM

## 2025-04-15 DIAGNOSIS — E78.5 DYSLIPIDEMIA: ICD-10-CM

## 2025-04-15 RX ORDER — ATORVASTATIN CALCIUM 20 MG/1
20 TABLET, FILM COATED ORAL DAILY
Qty: 90 TABLET | Refills: 1 | Status: SHIPPED | OUTPATIENT
Start: 2025-04-15

## 2025-04-15 RX ORDER — FERROUS SULFATE 325(65) MG
325 TABLET, DELAYED RELEASE (ENTERIC COATED) ORAL
Qty: 180 TABLET | Refills: 1 | Status: SHIPPED | OUTPATIENT
Start: 2025-04-15 | End: 2026-04-15

## 2025-04-15 RX ORDER — LISINOPRIL 5 MG/1
5 TABLET ORAL DAILY
Qty: 90 TABLET | Refills: 1 | Status: SHIPPED | OUTPATIENT
Start: 2025-04-15 | End: 2026-04-10

## 2025-04-15 ASSESSMENT — ACTIVITIES OF DAILY LIVING (ADL)
BATHING: INDEPENDENT
TAKING_MEDICATION: INDEPENDENT
DRESSING: INDEPENDENT
DOING_HOUSEWORK: NEEDS ASSISTANCE
MANAGING_FINANCES: TOTAL CARE
GROCERY_SHOPPING: NEEDS ASSISTANCE

## 2025-04-15 ASSESSMENT — LIFESTYLE VARIABLES
HOW MANY STANDARD DRINKS CONTAINING ALCOHOL DO YOU HAVE ON A TYPICAL DAY: PATIENT DOES NOT DRINK
AUDIT-C TOTAL SCORE: 0
HOW OFTEN DO YOU HAVE A DRINK CONTAINING ALCOHOL: NEVER
HOW OFTEN DO YOU HAVE SIX OR MORE DRINKS ON ONE OCCASION: NEVER
SKIP TO QUESTIONS 9-10: 1

## 2025-04-15 ASSESSMENT — PATIENT HEALTH QUESTIONNAIRE - PHQ9
2. FEELING DOWN, DEPRESSED OR HOPELESS: NOT AT ALL
SUM OF ALL RESPONSES TO PHQ9 QUESTIONS 1 AND 2: 0
1. LITTLE INTEREST OR PLEASURE IN DOING THINGS: NOT AT ALL

## 2025-04-15 ASSESSMENT — ENCOUNTER SYMPTOMS
OCCASIONAL FEELINGS OF UNSTEADINESS: 0
LOSS OF SENSATION IN FEET: 0
DEPRESSION: 0

## 2025-04-15 NOTE — PROGRESS NOTES
Subjective   Reason for Visit: Janay Hilliard is an 74 y.o. female here for a Medicare Wellness visit.     Past Medical, Surgical, and Family History reviewed and updated in chart.    Reviewed all medications by prescribing practitioner or clinical pharmacist (such as prescriptions, OTCs, herbal therapies and supplements) and documented in the medical record.    HPI      History of Present Illness  Janay Hilliard is a 74-year-old female with diabetes and chronic kidney disease who presents for a Medicare checkup and regular follow-up.    She recently moved to [facility] about six weeks ago and is adjusting well to the new environment, which offers social activities and amenities such as a library and a gym. She enjoys walking around the buildings and is considering participating in more physical activities.    She has a history of diabetes, managed by a nurse practitioner. Her recent blood work showed a fasting blood sugar level of 309 mg/dL and an A1c of 8.5%, which is higher than her previous readings of 7.7% and 8.6%. She is currently on Basaglar and Humalog insulins. She tries to eat healthy, especially during dinner, but struggles with other meals.    She has chronic kidney disease with a GFR of about 30-32, consistent with previous results. She sees a kidney specialist every six months and is due for a follow-up soon. Her urine tests have shown high protein levels, indicating diabetic nephropathy. She also had a protein electrophoresis and random urine protein tests done.    She experiences spasms in her hand and right foot, which she manages by laying them flat or holding them to stop the contraction. Her calcium and potassium levels are reportedly normal.  She states these occur after holding a book in 1 position or sitting in 1 position for a long time    She does not consume alcohol regularly and drinks coffee occasionally. She has increased her water intake due to the convenience of having a refrigerator  with water and ice.    She has not had a colonoscopy or mammogram and declines .she visits the dentist every three months and has seen her eye doctor recently.      Last well check  1 yr     Reported health fair     Dental check  reg 1q 3 mos      Vision check reg   Vision issues  sp vision loss stroke  sees 3 eye doc for that and dm   Hearing issues mild  Vaccines UTD  n    Diet healthy at [facility]   Exercise walks and going to the rec center   Caffeine  1 and 1/2 c   Alcohol none   Tobacco former     Colon cancer screening   dev declines and refuses mammo       Patient Care Team:  Taylor Nieto MD as PCP - General  Taylor Nieto MD as PCP - [facility] Attributed Provider  Tam Patrick as Care Manager (Case Management)  Raghu Gamble MD as Consulting Physician (Hematology and Oncology)     Review of Systems  GENERAL - Denies fever, fatigue or chills  SKIN - Denies rash, new skin lesions, or change in moles  EYES -poor vision following diabetic retinopathy and strokes   EARS - Denies ear pain, discharge, ringing, or difficulty hearing  NOSE - Denies nasal congestion, discharge, or bleeding  MOUTH - Denies sore throat, postnasal drip or painful/difficulty swallowing  NECK - Denies pain or swelling  RESPIRATORY - Denies shortness of breath, cough, wheezing  CARDIOVASCULAR - Denies palpitations, chest pain, orthopnea, peripheral edema, syncope or claudication  GASTROINTESTINAL - Denies nausea, vomiting, diarrhea, constipation, abdominal pain, melena and or bright red blood  GENITOURINARY - Denies dysuria, frequency of urination, urgency, or hesitancy  MUSCULOSKELETAL - Denies joint or muscle pain, or back pain  NEUROLOGICAL -see above history of CVA residual vision changes  PSYCHIATRIC - Denies depression, anxiety, substance abuse, suicidal or homicidal ideation  ENDOCRINE -chronic poorly controlled diabetes HEMATO-IMMUNOLOGIC - Denies easy bruising, bleeding, oral ulcerations or recurrent  "infections    Objective   Vitals:  /68   Pulse 78   Temp 36.6 °C (97.8 °F) (Oral)   Ht 1.499 m (4' 11\")   Wt (!) 44 kg (97 lb)   SpO2 95%   BMI 19.59 kg/m²       Physical Exam  CONSTITUTIONAL - well nourished, well developed, looks like stated age, in no acute distress, not ill-appearing, and not tired appearing  SKIN - normal skin color and pigmentation, normal skin turgor without rash, lesions, or nodules visualized  HEAD - no trauma, normocephalic  EYES - pupils are equal and reactive to light, extraocular muscles are intact, and normal external exam  ENT - TM's intact, no injection, no signs of infection, uvula midline, normal tongue movement and throat normal, no exudate, nasal passage without discharge and patent  NECK - supple without rigidity, no neck mass was observed, no thyromegaly or thyroid nodules  CHEST - clear to auscultation, no wheezing, no crackles and no rales, good effort  CARDIAC - regular rate and regular rhythm, no skipped beats, no murmur  ABDOMEN - no organomegaly, soft, nontender, nondistended, normal bowel sounds, no guarding/rebound/rigidity, negative McBurney sign and negative Banks sign  EXTREMITIES - no edema, no deformities  NEUROLOGICAL - normal nonfocal no deficits  PSYCHIATRIC - alert, pleasant and cordial, age-appropriate  IMMUNOLOGIC - no cervical lymphadenopathy    Assessment/Plan   Diagnoses and all orders for this visit:  Benign essential hypertension  -     Lipid Panel; Future  -     Comprehensive Metabolic Panel; Future  Anemia due to stage 4 chronic kidney disease  -     ferrous sulfate 325 (65 Fe) mg EC tablet; Take 1 tablet by mouth 2 times a day after meals. Do not crush, chew, or split.  Cerebrovascular accident (CVA) due to stenosis of cerebral artery (Multi)  -     apixaban (Eliquis) 5 mg tablet; Take 1 tablet (5 mg) by mouth every 12 hours.  -     atorvastatin (Lipitor) 20 mg tablet; Take 1 tablet (20 mg) by mouth once daily.  Benign essential HTN  -    "  atorvastatin (Lipitor) 20 mg tablet; Take 1 tablet (20 mg) by mouth once daily.  -     lisinopril 5 mg tablet; Take 1 tablet (5 mg) by mouth once daily.  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 Recent labs show fasting blood sugar of 309 mg/dL and HbA1c of 8.5%, indicating poor glycemic control. GFR is 30-32, consistent with CKD stage 3b. Significant proteinuria is present, a marker of diabetic nephropathy. She is managed by a nurse practitioner for diabetes and sees a nephrologist biannually. The nephrologist has conducted protein electrophoresis and random urine protein tests, confirming proteinuria.  - Continue current insulin regimen with Basaglar and Humalog.  - Ensure follow-up with nephrologist, Dr. Randall, on May 15th.  - Order blood work one week prior to nephrology appointment.  - Monitor urine protein levels and kidney function regularly.    General Health Maintenance  She has not had a colonoscopy or mammogram, which are typically recommended for cancer screening. Encouraged to maintain a healthy diet and exercise routine. She is considering participating in supervised gym activities and walking for exercise.  - Encourage participation in supervised gym activities and walking for exercise.  - Discuss the importance of pairing meals with protein to improve dietary habits.  Declines colonoscopy  - Consider scheduling a mammogram for breast cancer screening.  Patient declines           This medical note was created with the assistance of artificial intelligence (AI) for documentation  purposes. The content has been reviewed and confirmed by the healthcare provider for accuracy and completeness. Patient consented to the use of audio recording and use of AI during their visit.

## 2025-04-15 NOTE — PROGRESS NOTES
"Care Management Monthly Outreach  Confirmation of at least 2 patient identifiers  Chart reviewed completed    Last Office Visit:   4/15/25  Next Office Visit:   7/18/25  Next Specialist Appt:   Podiatry 4/22, Endocrinology 5/29  APC: n/a    Has patient been to ER/Urgent Care since last outreach? No    Medications:   Are there medication changes since last visit? No  Refills needed? No      Care Gaps Addressed? Complete    Health Maintenance Due   Topic Date Due    Hepatitis C Screening  Never done    Hepatitis A Vaccine (1 of 2 - Risk 2-dose series) Never done    Pneumococcal Vaccination (1 of 2 - PCV) Never done    DTaP/Tdap/Td Vaccines (1 - Tdap) Never done    Hepatitis B Vaccine (1 of 3 - Risk 3-dose series) Never done         Chronic Conditions and Outreach Summary:   Stage 3b chronic kidney disease (Multi)    Type 2 diabetes mellitus with stable proliferative retinopathy of both eyes, with long-term current use of insulin    Patient states she is doing well right now. She is living at [facility] now and she is enjoying it there. She has been going down to dinner every evening and gets to socialize with everyone at her table. She said she is having a hard time remembering anyone's names but besides that, she is doing well.     One concern I had was her A1c and fasting blood glucose. A1c is up to 8.5 from 7.7 in February. I talked to her about her diet and she said, \"I am 74, I have made it this long, I just want to keep living how I am\". I told her I can definitely suggest ways to lower her blood sugar and A1c but ultimately it is up to her to make the changes. She uses insulin and she is going to talk to her endocrinologist about increasing her insulin when she sees her next.    Social Drivers of Health: Complete    Care Plan addressed: Yes    Last filed Vital Signs Reviewed:  BP Readings from Last 3 Encounters:   04/15/25 138/68   03/28/25 162/82   02/28/25 172/78        Labs Reviewed:  Lab Results "   Component Value Date    CREATININE 1.68 (H) 04/11/2025    GLUCOSE 309 (H) 04/11/2025    ALKPHOS 62 04/11/2025    K 4.6 04/11/2025    PROT 7.5 04/11/2025     04/11/2025    AST 23 04/11/2025    ALT 18 04/11/2025    BUN 39 (H) 04/11/2025    PHOS 4.5 10/10/2024    GFRF 38 (A) 09/21/2023     Lab Results   Component Value Date    TRIG 61 04/11/2025    CHOL 174 04/11/2025    LDLCALC 95 04/11/2025    HDL 64 04/11/2025     Lab Results   Component Value Date    HGBA1C 8.5 (H) 04/11/2025    HGBA1C 7.7 (A) 02/26/2025    HGBA1C 7.6 (H) 01/03/2025     Lab Results   Component Value Date    WBC 5.4 04/11/2025    RBC 4.11 04/11/2025    HGB 13.0 04/11/2025     04/11/2025           No other concerns at this time.  Agreeable to continue monthly outreaches.  Encouraged to call if questions or concerns arise.      Tam ABREUN, RN, [facility]  Chronic Care Management  [phone]

## 2025-04-22 ENCOUNTER — APPOINTMENT (OUTPATIENT)
Dept: PODIATRY | Facility: CLINIC | Age: 75
End: 2025-04-22
Payer: MEDICARE

## 2025-04-22 DIAGNOSIS — B35.1 TINEA UNGUIUM: Primary | ICD-10-CM

## 2025-04-22 DIAGNOSIS — M79.674 TOE PAIN, RIGHT: ICD-10-CM

## 2025-04-22 DIAGNOSIS — E11.42 DIABETIC POLYNEUROPATHY ASSOCIATED WITH TYPE 2 DIABETES MELLITUS: ICD-10-CM

## 2025-04-22 DIAGNOSIS — Z79.4 ENCOUNTER FOR LONG-TERM (CURRENT) USE OF INSULIN: ICD-10-CM

## 2025-04-22 DIAGNOSIS — M79.675 TOE PAIN, LEFT: ICD-10-CM

## 2025-04-22 PROCEDURE — 4010F ACE/ARB THERAPY RXD/TAKEN: CPT | Performed by: PODIATRIST

## 2025-04-22 PROCEDURE — 3049F LDL-C 100-129 MG/DL: CPT | Performed by: PODIATRIST

## 2025-04-22 PROCEDURE — 1123F ACP DISCUSS/DSCN MKR DOCD: CPT | Performed by: PODIATRIST

## 2025-04-22 PROCEDURE — 3051F HG A1C>EQUAL 7.0%<8.0%: CPT | Performed by: PODIATRIST

## 2025-04-22 PROCEDURE — 1036F TOBACCO NON-USER: CPT | Performed by: PODIATRIST

## 2025-04-22 PROCEDURE — 1159F MED LIST DOCD IN RCRD: CPT | Performed by: PODIATRIST

## 2025-04-22 PROCEDURE — 11721 DEBRIDE NAIL 6 OR MORE: CPT | Performed by: PODIATRIST

## 2025-04-22 NOTE — PROGRESS NOTES
"CC: painful thickened and elongated toenails and diabetic care.      HPI: Pt presents for dm foot exam and painful thickened and elongated toenails that are difficult to manage.  Onset was gradual with worsening course until recently.  Aggravated by shoe gear and ambulation.  Hit her toe on the rollator, nail is discolored. It is growning out.  AIC 8.5.     PCP: Dr. Nieto  Last visit: 4-15-25     PMH  Medical History           Past Medical History:   Diagnosis Date    Personal history of other diseases of the nervous system and sense organs 05/05/2021     History of hearing loss    Type 2 diabetes mellitus with diabetic chronic kidney disease (CMS/HCC) 03/26/2022     CKD stage 3 secondary to diabetes    Type 2 diabetes mellitus with diabetic chronic kidney disease (CMS/HCC) 04/13/2022     CKD stage 3 secondary to diabetes         MEDS     Current Outpatient Medications:     apixaban (Eliquis) 5 mg tablet, Take 1 tablet (5 mg) by mouth every 12 hours., Disp: 180 tablet, Rfl: 0    atorvastatin (Lipitor) 20 mg tablet, Take 1 tablet (20 mg) by mouth once daily., Disp: 90 tablet, Rfl: 0    cholecalciferol (Vitamin D-3) 25 MCG (1000 UT) tablet, Take by mouth., Disp: , Rfl:     diclofenac sodium 1 % kit, apply sparingly tto affected  area wice daibrenadn as needed, Disp: , Rfl:     fluticasone (Flonase) 50 mcg/actuation nasal spray, Administer 1 spray into affected nostril(s)., Disp: , Rfl:     insulin aspart (NovoLOG) 100 unit/mL (3 mL) pen, Inject under the skin 3 times a day with meals. 9 units 5 units 9 units w meals, Disp: , Rfl:     lisinopril 5 mg tablet, Take 1 tablet (5 mg) by mouth 2 times a day. (Patient taking differently: Take 1 tablet (5 mg) by mouth once daily.), Disp: 180 tablet, Rfl: 1    omeprazole (PriLOSEC) 20 mg DR capsule, Take 1 capsule (20 mg) by mouth once daily in the morning. Take before meals., Disp: 90 capsule, Rfl: 0    pen needle, diabetic 32 gauge x 5/32\" needle, once daily. Use four pen needles " every day, Disp: , Rfl:     Restasis 0.05 % ophthalmic emulsion, INSTILL 1 DROP IN BOTH EYES TWICE DAILY, Disp: , Rfl:     Tresiba FlexTouch U-100 100 unit/mL (3 mL) injection, Inject 18 Units under the skin once daily in the morning., Disp: 3 mL, Rfl: 0  Allergies            Allergies   Allergen Reactions    Penicillins Cough       Happened when patient was a child    Sulfa (Sulfonamide Antibiotics) Rash      Social History   Social History                Socioeconomic History    Marital status:        Spouse name: Not on file    Number of children: Not on file    Years of education: Not on file    Highest education level: Not on file   Occupational History    Not on file   Tobacco Use    Smoking status: Never    Smokeless tobacco: Never   Vaping Use    Vaping Use: Never used   Substance and Sexual Activity    Alcohol use: Never    Drug use: Never    Sexual activity: Defer   Other Topics Concern    Not on file   Social History Narrative    Not on file      Social Determinants of Health      Financial Resource Strain: Not on file   Food Insecurity: Not on file   Transportation Needs: Not on file   Physical Activity: Not on file   Stress: Not on file   Social Connections: Not on file   Intimate Partner Violence: Not on file   Housing Stability: Not on file         Family History               Family History   Problem Relation Name Age of Onset    Diabetes Father        Diabetes Paternal Grandmother             Surgical History             Past Surgical History:   Procedure Laterality Date    OTHER SURGICAL HISTORY   2021     Tonsillectomy    OTHER SURGICAL HISTORY   2021      section    OTHER SURGICAL HISTORY   2021     Appendectomy            REVIEW OF SYSTEMS  DERM:   + as noted in HPI.         Physical examination:   On General Observation: Patient is a pleasant, cooperative, well developed 73 y.o. diabetic   adult female. The patient is alert and oriented to time, place and  person. Patient has normal affect and mood.  There were no vitals taken for this visit.     Vascular:  DP and PT pulses are 2/4 b/l.  mild edema noted. mild varicosities b/l.  CFT  6 seconds to all digits bilateral.  Skin temperature is warm to warm from proximal to distal bilateral.       Muscular: Strength is 5/5 for all instrinsic and extrinsic muscle groups.      Neuro:  Proprioception decreased.   Sensation to vibration is  decreased. Protective sensation decreased  at all pedal sites via [place] 5.07 monofilament bilateral.  Light touch present bilateral.      Derm:    Left toenails: 1-5 Brittleness, crumbling upon debridement, subungual debris, elongation, mycotic appearance, tenderness, and thickness.   Right toenails: 1-5 Brittleness, crumbling upon debridement, subungual debris, elongation, mycotic appearance, tenderness, and thickness.   Hair growth is decreased b/l le  hematoma is present right hallux, it is growing out distally, no odor or drainage is present     ASSESSMENT:    Subungual hematoma right hallux-resolving  Tinea Unguium [B35.1]   E11.42 on insulin  Pain in right toe(s) [M79.674]   Pain in left toe(s) [M79.675]         PLAN:   Exam  Reviewed with pt, hematoma is dry and no infection or lysis of the nail, reviewed and will monitor the toe, it is growing out.  DM foot care and DM foot manifestations were reviewed.  The patient was educated on clinical findings, diagnosis and treatment plans. Patient understands all that has been explained and all questions were answered to apparent satisfaction.      - Debrided toenails 1-10 in length and height.   - Follow up in 9-12 weeks.         Haim Best DPM

## 2025-05-15 ENCOUNTER — PATIENT OUTREACH (OUTPATIENT)
Dept: PRIMARY CARE | Facility: CLINIC | Age: 75
End: 2025-05-15
Payer: MEDICARE

## 2025-05-15 DIAGNOSIS — N18.32 STAGE 3B CHRONIC KIDNEY DISEASE (MULTI): ICD-10-CM

## 2025-05-15 DIAGNOSIS — Z79.4 TYPE 2 DIABETES MELLITUS WITH STABLE PROLIFERATIVE RETINOPATHY OF BOTH EYES, WITH LONG-TERM CURRENT USE OF INSULIN: ICD-10-CM

## 2025-05-15 DIAGNOSIS — E11.3553 TYPE 2 DIABETES MELLITUS WITH STABLE PROLIFERATIVE RETINOPATHY OF BOTH EYES, WITH LONG-TERM CURRENT USE OF INSULIN: ICD-10-CM

## 2025-05-15 NOTE — PROGRESS NOTES
Care Management Monthly Outreach  Chart review completed  Confirmation of at least 2 patient identifiers  Change in insurance? No    Has patient been to ER/Urgent Care since last outreach? No    Last Office Visit with PCP: 4/15/2025   Next Office Visit with PCP: 7/18/2025   APC Collaboration: n/a    Chronic Conditions and Outreach Summary:   Stage 3b chronic kidney disease (Multi)    Type 2 diabetes mellitus with stable proliferative retinopathy of both eyes, with long-term current use of insulin    I spoke to patient's daughter, Gregoria. She said patient is doing well. She saw the nephrologist today. According to the daughter, the patient's protein was off so they increased her lisinopril by 5mg. We will wait to see what the fax from the nephrologist says.     Patient is still doing very well at [facility]. She is getting out and at least going to dinner every evening which she has been enjoying. She has been making friends and enjoys living there. Nothing needed.    Medications:   Are there medication changes since last visit? Yes - lisinopril increased by 5mg  Refills needed? No    Social Drivers of Health: Addressed in the last 6 months  Care Gaps Addressed? Yes  Care Plan addressed: Yes    Upcoming Appointments:   Future Appointments       Date / Time Provider Department Dept Phone    7/18/2025 10:15 AM (Arrive by 10:00 AM) Taylor Nieto MD [facility] Family & Internal Medicine/Peds [phone]    7/22/2025 10:40 AM Haim Best DPM [facility] [phone]    10/2/2025 2:00 PM (Arrive by 1:45 PM) Raghu Gamble MD [facility] [phone]    1/13/2026 11:00 AM MED ECHO/STRESS [facility] [phone]    2/3/2026 1:00 PM Mirta Downey; AUDIO MARKET [facility] ROOM 1 [facility] [phone]    2/3/2026 1:30 PM Kalin Jacobson DMD, MD [facility] [phone]    2/3/2026 2:00 PM Mirta Downey; AUDIO [employer]ET HA ROOM 3  [facility] [phone]    2/24/2026 11:15 AM Herbert Pretty MD PhD [facility] [phone]            Blood Pressures Reviewed  BP Readings from Last 3 Encounters:   04/15/25 138/68   03/28/25 162/82   02/28/25 172/78       Labs Reviewed:  Lab Results   Component Value Date    CREATININE 1.68 (H) 04/11/2025    GLUCOSE 309 (H) 04/11/2025    ALKPHOS 62 04/11/2025    K 4.6 04/11/2025    PROT 7.5 04/11/2025     04/11/2025    CALCIUM 9.2 04/11/2025    AST 23 04/11/2025    ALT 18 04/11/2025    BUN 39 (H) 04/11/2025    PHOS 4.5 10/10/2024    GFRF 38 (A) 09/21/2023     Lab Results   Component Value Date    TRIG 61 04/11/2025    CHOL 174 04/11/2025    LDLCALC 95 04/11/2025    HDL 64 04/11/2025     Lab Results   Component Value Date    HGBA1C 8.5 (H) 04/11/2025    HGBA1C 7.7 (A) 02/26/2025    HGBA1C 7.6 (H) 01/03/2025     Lab Results   Component Value Date    WBC 5.4 04/11/2025    RBC 4.11 04/11/2025    HGB 13.0 04/11/2025     04/11/2025       No other concerns at this time.  Agreeable to continue monthly outreaches.  Encouraged to call if questions or concerns arise.    Tam ABREUN, RN, [facility]  [phone]

## 2025-06-18 ENCOUNTER — PATIENT OUTREACH (OUTPATIENT)
Dept: PRIMARY CARE | Facility: CLINIC | Age: 75
End: 2025-06-18
Payer: MEDICARE

## 2025-06-18 DIAGNOSIS — E11.3553 TYPE 2 DIABETES MELLITUS WITH STABLE PROLIFERATIVE RETINOPATHY OF BOTH EYES, WITH LONG-TERM CURRENT USE OF INSULIN: ICD-10-CM

## 2025-06-18 DIAGNOSIS — N18.32 STAGE 3B CHRONIC KIDNEY DISEASE (MULTI): ICD-10-CM

## 2025-06-18 DIAGNOSIS — Z79.4 TYPE 2 DIABETES MELLITUS WITH STABLE PROLIFERATIVE RETINOPATHY OF BOTH EYES, WITH LONG-TERM CURRENT USE OF INSULIN: ICD-10-CM

## 2025-06-18 NOTE — PROGRESS NOTES
Care Management Monthly Outreach  Chart review completed  Confirmation of at least 2 patient identifiers  Change in insurance? No    Has patient been to ER/Urgent Care since last outreach? No    Last Office Visit with PCP: 4/15/2025   Next Office Visit with PCP: 7/18/2025   APC Collaboration: n/a    Chronic Conditions and Outreach Summary:   Stage 3b chronic kidney disease (Multi)    Type 2 diabetes mellitus with stable proliferative retinopathy of both eyes, with long-term current use of insulin    I spoke to patient's daughter, Gregoria. Patient is doing great in [facility] independent living. She has been thriving there. She has been eating better since the last time I talked to her and she saw her endocrinologist who did an A1c which showed it was 7.3 which is down from 8.5. Patient has had significant improvement in her lifestyle and in her mindset since living in [facility].    I talked to the patient's daughter about my new position with  and let her know one of my coworkers would be reaching out to her until someone is hired on full-time to take my position. She said at this point, she feels her mother has improved so much, she would like to have her removed from the Chronic Care Management program and if she needs to be enrolled again in the future, she will let Dr. Nieto know.    I am graduating the patient and closing the program.    Medications:   Are there medication changes since last visit? No  Refills needed? No    Social Drivers of Health: Addressed in the last 6 months  Care Gaps Addressed? Yes  Care Plan addressed: Yes    Upcoming Appointments:   Future Appointments       Date / Time Provider Department Dept Phone    7/18/2025 10:15 AM (Arrive by 10:00 AM) Taylor Nieto MD [facility] Family & Internal Medicine/Peds [phone]    7/22/2025 10:40 AM Haim Best DPM [facility] [phone]    10/2/2025 2:00 PM (Arrive by 1:45 PM) Raghu Gamble MD  Oli Garcia  [facility] [phone]    1/13/2026 11:00 AM MED ECHO/STRESS [facility] [phone]    2/3/2026 1:00 PM Mirta Downey; [employer]UD[employer]ET BTH ROOM 1 [facility] [phone]    2/3/2026 1:30 PM Kalin Jacobson DMD, MD [facility] [phone]    2/3/2026 2:00 PM Mirta Downey; AUDIO [employer]ET HA ROOM 3 [facility] [phone]    2/24/2026 11:15 AM Herbert Pretty MD PhD [facility] [phone]            Blood Pressures Reviewed  BP Readings from Last 3 Encounters:   04/15/25 138/68   03/28/25 162/82   02/28/25 172/78       Labs Reviewed:  Lab Results   Component Value Date    CREATININE 1.68 (H) 04/11/2025    GLUCOSE 309 (H) 04/11/2025    ALKPHOS 62 04/11/2025    K 4.6 04/11/2025    PROT 7.5 04/11/2025     04/11/2025    CALCIUM 9.2 04/11/2025    AST 23 04/11/2025    ALT 18 04/11/2025    BUN 39 (H) 04/11/2025    PHOS 4.5 10/10/2024    GFRF 38 (A) 09/21/2023     Lab Results   Component Value Date    TRIG 61 04/11/2025    CHOL 174 04/11/2025    LDLCALC 95 04/11/2025    HDL 64 04/11/2025     Lab Results   Component Value Date    HGBA1C 7.3 (H) 05/23/2025    HGBA1C 8.5 (H) 04/11/2025    HGBA1C 7.7 (A) 02/26/2025     Lab Results   Component Value Date    WBC 5.4 04/11/2025    RBC 4.11 04/11/2025    HGB 13.0 04/11/2025     04/11/2025       No other concerns at this time.  Agreeable to continue monthly outreaches.  Encouraged to call if questions or concerns arise.    Tam ABREUN, RN, [facility]  [phone]

## 2025-06-26 DIAGNOSIS — Z78.0 POSTMENOPAUSAL: ICD-10-CM

## 2025-06-26 DIAGNOSIS — M81.0 OSTEOPOROSIS, UNSPECIFIED OSTEOPOROSIS TYPE, UNSPECIFIED PATHOLOGICAL FRACTURE PRESENCE: ICD-10-CM

## 2025-07-01 DIAGNOSIS — Z79.4 TYPE 2 DIABETES MELLITUS WITH STABLE PROLIFERATIVE RETINOPATHY OF BOTH EYES, WITH LONG-TERM CURRENT USE OF INSULIN: ICD-10-CM

## 2025-07-01 DIAGNOSIS — E78.5 DYSLIPIDEMIA: ICD-10-CM

## 2025-07-01 DIAGNOSIS — E11.3553 TYPE 2 DIABETES MELLITUS WITH STABLE PROLIFERATIVE RETINOPATHY OF BOTH EYES, WITH LONG-TERM CURRENT USE OF INSULIN: ICD-10-CM

## 2025-07-01 DIAGNOSIS — I10 BENIGN ESSENTIAL HYPERTENSION: ICD-10-CM

## 2025-07-18 ENCOUNTER — APPOINTMENT (OUTPATIENT)
Dept: PRIMARY CARE | Facility: CLINIC | Age: 75
End: 2025-07-18
Payer: MEDICARE

## 2025-07-18 VITALS
OXYGEN SATURATION: 96 % | DIASTOLIC BLOOD PRESSURE: 72 MMHG | HEIGHT: 59 IN | SYSTOLIC BLOOD PRESSURE: 128 MMHG | TEMPERATURE: 98.1 F | HEART RATE: 85 BPM | BODY MASS INDEX: 19.96 KG/M2 | WEIGHT: 99 LBS

## 2025-07-18 DIAGNOSIS — E05.90 HYPERTHYROIDISM: Primary | ICD-10-CM

## 2025-07-18 DIAGNOSIS — I10 BENIGN ESSENTIAL HTN: ICD-10-CM

## 2025-07-18 DIAGNOSIS — Z79.4 TYPE 2 DIABETES MELLITUS WITH STABLE PROLIFERATIVE RETINOPATHY OF BOTH EYES, WITH LONG-TERM CURRENT USE OF INSULIN: ICD-10-CM

## 2025-07-18 DIAGNOSIS — E11.3553 TYPE 2 DIABETES MELLITUS WITH STABLE PROLIFERATIVE RETINOPATHY OF BOTH EYES, WITH LONG-TERM CURRENT USE OF INSULIN: ICD-10-CM

## 2025-07-18 DIAGNOSIS — N18.32 STAGE 3B CHRONIC KIDNEY DISEASE (MULTI): ICD-10-CM

## 2025-07-18 PROCEDURE — 1159F MED LIST DOCD IN RCRD: CPT | Performed by: INTERNAL MEDICINE

## 2025-07-18 PROCEDURE — 3078F DIAST BP <80 MM HG: CPT | Performed by: INTERNAL MEDICINE

## 2025-07-18 PROCEDURE — 3074F SYST BP LT 130 MM HG: CPT | Performed by: INTERNAL MEDICINE

## 2025-07-18 PROCEDURE — G2211 COMPLEX E/M VISIT ADD ON: HCPCS | Performed by: INTERNAL MEDICINE

## 2025-07-18 PROCEDURE — 99214 OFFICE O/P EST MOD 30 MIN: CPT | Performed by: INTERNAL MEDICINE

## 2025-07-18 PROCEDURE — 1160F RVW MEDS BY RX/DR IN RCRD: CPT | Performed by: INTERNAL MEDICINE

## 2025-07-18 ASSESSMENT — ENCOUNTER SYMPTOMS
LOSS OF SENSATION IN FEET: 0
OCCASIONAL FEELINGS OF UNSTEADINESS: 0
DEPRESSION: 0

## 2025-07-18 NOTE — PROGRESS NOTES
Subjective   Patient ID: Janay Hilliard is a 75 y.o. female who presents for Follow-up (EP.  Follow up HTN, DMII, hyperlipidemia.  Labs done.  Glucose going low.).  HPI    History of Present Illness  Janay Hilliard is a 75 year old female with diabetes who presents for a three-month checkup. She is accompanied by her daughter.     She has been experiencing episodes of hypoglycemia, with a significant event occurring yesterday when her blood sugar dropped below 40 mg/dL. Her daughter assisted her during this episode. Blood sugar levels have been lower than usual over the past week on one occasion or so    She is on a regimen of two types of insulin: a long-acting insulin taken once daily in the morning and a short-acting insulin taken before meals. She takes 11 units of the long-acting insulin and varying doses of the short-acting insulin: 7 units at breakfast, 5 units at lunch, and 6 units at dinner. She follows a routine of taking her insulin and waiting half an hour before eating breakfast, which she has been doing for many years.    She uses a continuous glucose monitor that alerts her to low blood sugar levels, and she confirms that she can hear the alarm even at night. She also checks her blood sugar with a handheld meter.  Her daughter reviewed and she will 1% low 46% were in the normal range and the remainder were they did put a call into the endocrinologist    Her current medications include Eliquis for stroke prevention, atorvastatin for cholesterol management, iron supplements, and omeprazole for acid reflux. She is unsure about her vitamin D intake. She also takes lisinopril twice daily and uses eye drops.    She resides in an assisted living facility where she has access to assistance if needed. She keeps her cell phone and glucose meter with her at all times for emergencies.    She reports not sleeping well at night and feeling tired during the day. She also mentions having a history of high blood sugar  "levels in the past, but her recent episodes have been of low blood sugar.  She denies chest pains or shortness of breath  She does have fatigue    Review of Systems  Review of systems was performed and is otherwise negative except as noted in HPI.      Objective   /72   Pulse 85   Temp 36.7 °C (98.1 °F) (Oral)   Ht (!) 1.499 m (4' 11\")   Wt (!) 44.9 kg (99 lb)   SpO2 96%   BMI 20.00 kg/m²      Physical Exam  HEENT is normal  Lungs clear bilaterally  Heart is regular rate rhythm no murmurs  Abdomen benign  Lower extremities no edema     Assessment/Plan   Diagnoses and all orders for this visit:  Hyperthyroidism  Type 2 diabetes mellitus with stable proliferative retinopathy of both eyes, with long-term current use of insulin  -     Hemoglobin A1C; Future  Stage 3b chronic kidney disease (Multi)  -     Lipid Panel; Future  -     Comprehensive Metabolic Panel; Future  Benign essential HTN  -     Lipid Panel; Future  -     Comprehensive Metabolic Panel; Future    Assessment & Plan  Diabetes mellitus with hypoglycemia  Recent episodes of hypoglycemia, including a significant drop to below 40 mg/dL. She is on Basaglar and Humalog. Hypoglycemia may be due to timing of insulin administration relative to meals, particularly with short-acting insulin. Glucose meter indicates 1% low and 1% very low readings over the last 30 days, consistent with previous patterns. A1c is 7.7, slightly elevated from previous 7.3, indicating more high than low glucose levels overall.  - Contact endocrinologist to discuss recent hypoglycemic episodes.  - Adjust timing of short-acting insulin (Humalog) to coincide with meals to prevent hypoglycemia.  - Ensure consistent meal intake, particularly in the morning, to avoid low blood sugar.  - Monitor glucose levels closely and adjust insulin doses as needed.    Dyslipidemia  Cholesterol levels are well-controlled with atorvastatin. Total cholesterol is 171 mg/dL, LDL is 93 mg/dL, HDL is " 62 mg/dL, and the ratio is 2.8, indicating good lipid control.    Stroke  Continues on apixaban for anticoagulation due to cerebrovascular accident. No new neurological symptoms reported.  - Continue apixaban 5 mg oral every 12 hours.  Call with issues  Follow-up in 3 months  Blood work ordered    Taylor Nieto MD  This medical note was created with the assistance of artificial intelligence (AI) for documentation purposes. The content has been reviewed and confirmed by the healthcare provider for accuracy and completeness. Patient consented to the use of audio recording and use of AI during their visit.

## 2025-07-22 ENCOUNTER — APPOINTMENT (OUTPATIENT)
Dept: PODIATRY | Facility: CLINIC | Age: 75
End: 2025-07-22
Payer: MEDICARE

## 2025-07-22 DIAGNOSIS — M79.675 TOE PAIN, LEFT: ICD-10-CM

## 2025-07-22 DIAGNOSIS — Z79.4 ENCOUNTER FOR LONG-TERM (CURRENT) USE OF INSULIN: ICD-10-CM

## 2025-07-22 DIAGNOSIS — E11.42 DIABETIC POLYNEUROPATHY ASSOCIATED WITH TYPE 2 DIABETES MELLITUS: ICD-10-CM

## 2025-07-22 DIAGNOSIS — M79.674 TOE PAIN, RIGHT: ICD-10-CM

## 2025-07-22 DIAGNOSIS — B35.1 TINEA UNGUIUM: Primary | ICD-10-CM

## 2025-07-22 PROCEDURE — 11721 DEBRIDE NAIL 6 OR MORE: CPT | Performed by: PODIATRIST

## 2025-07-22 PROCEDURE — 1159F MED LIST DOCD IN RCRD: CPT | Performed by: PODIATRIST

## 2025-07-22 NOTE — PROGRESS NOTES
"CC: painful thickened and elongated toenails and diabetic care.      HPI: Pt presents for dm foot exam and painful thickened and elongated toenails that are difficult to manage.  Onset was gradual with worsening course until recently.  Aggravated by shoe gear and ambulation.   AIC 7.7.     PCP: Dr. Nieto  Last visit: 7-18-25     PMH  Medical History           Past Medical History:   Diagnosis Date    Personal history of other diseases of the nervous system and sense organs 05/05/2021     History of hearing loss    Type 2 diabetes mellitus with diabetic chronic kidney disease (CMS/HCC) 03/26/2022     CKD stage 3 secondary to diabetes    Type 2 diabetes mellitus with diabetic chronic kidney disease (CMS/HCC) 04/13/2022     CKD stage 3 secondary to diabetes         MEDS     Current Outpatient Medications:     apixaban (Eliquis) 5 mg tablet, Take 1 tablet (5 mg) by mouth every 12 hours., Disp: 180 tablet, Rfl: 0    atorvastatin (Lipitor) 20 mg tablet, Take 1 tablet (20 mg) by mouth once daily., Disp: 90 tablet, Rfl: 0    cholecalciferol (Vitamin D-3) 25 MCG (1000 UT) tablet, Take by mouth., Disp: , Rfl:     diclofenac sodium 1 % kit, apply sparingly tto affected  area wice daiy as needed, Disp: , Rfl:     fluticasone (Flonase) 50 mcg/actuation nasal spray, Administer 1 spray into affected nostril(s)., Disp: , Rfl:     insulin aspart (NovoLOG) 100 unit/mL (3 mL) pen, Inject under the skin 3 times a day with meals. 9 units 5 units 9 units w meals, Disp: , Rfl:     lisinopril 5 mg tablet, Take 1 tablet (5 mg) by mouth 2 times a day. (Patient taking differently: Take 1 tablet (5 mg) by mouth once daily.), Disp: 180 tablet, Rfl: 1    omeprazole (PriLOSEC) 20 mg DR capsule, Take 1 capsule (20 mg) by mouth once daily in the morning. Take before meals., Disp: 90 capsule, Rfl: 0    pen needle, diabetic 32 gauge x 5/32\" needle, once daily. Use four pen needles every day, Disp: , Rfl:     Restasis 0.05 % ophthalmic emulsion, " INSTILL 1 DROP IN BOTH EYES TWICE DAILY, Disp: , Rfl:     Tresiba FlexTouch U-100 100 unit/mL (3 mL) injection, Inject 18 Units under the skin once daily in the morning., Disp: 3 mL, Rfl: 0  Allergies            Allergies   Allergen Reactions    Penicillins Cough       Happened when patient was a child    Sulfa (Sulfonamide Antibiotics) Rash      Social History   Social History                Socioeconomic History    Marital status:        Spouse name: Not on file    Number of children: Not on file    Years of education: Not on file    Highest education level: Not on file   Occupational History    Not on file   Tobacco Use    Smoking status: Never    Smokeless tobacco: Never   Vaping Use    Vaping Use: Never used   Substance and Sexual Activity    Alcohol use: Never    Drug use: Never    Sexual activity: Defer   Other Topics Concern    Not on file   Social History Narrative    Not on file      Social Determinants of Health      Financial Resource Strain: Not on file   Food Insecurity: Not on file   Transportation Needs: Not on file   Physical Activity: Not on file   Stress: Not on file   Social Connections: Not on file   Intimate Partner Violence: Not on file   Housing Stability: Not on file         Family History               Family History   Problem Relation Name Age of Onset    Diabetes Father        Diabetes Paternal Grandmother             Surgical History             Past Surgical History:   Procedure Laterality Date    OTHER SURGICAL HISTORY   2021     Tonsillectomy    OTHER SURGICAL HISTORY   2021      section    OTHER SURGICAL HISTORY   2021     Appendectomy            REVIEW OF SYSTEMS  DERM:   + as noted in HPI.         Physical examination:   On General Observation: Patient is a pleasant, cooperative, well developed 73 y.o. diabetic   adult female. The patient is alert and oriented to time, place and person. Patient has normal affect and mood.  There were no vitals taken  for this visit.     Vascular:  DP and PT pulses are 2/4 b/l.  mild edema noted. mild varicosities b/l.  CFT  6 seconds to all digits bilateral.  Skin temperature is warm to warm from proximal to distal bilateral.       Muscular: Strength is 5/5 for all instrinsic and extrinsic muscle groups.      Neuro:  Proprioception decreased.   Sensation to vibration is  decreased. Protective sensation decreased  at all pedal sites via [place] 5.07 monofilament bilateral.  Light touch present bilateral.      Derm:    Left toenails: 1-5 Brittleness, crumbling upon debridement, subungual debris, elongation, mycotic appearance, tenderness, and thickness.   Right toenails: 1-5 Brittleness, crumbling upon debridement, subungual debris, elongation, mycotic appearance, tenderness, and thickness.   Hair growth is decreased b/l le       ASSESSMENT:      Tinea Unguium [B35.1]   E11.42 on insulin  Pain in right toe(s) [M79.674]   Pain in left toe(s) [M79.675]         PLAN:     DM foot care and DM foot manifestations were reviewed.  The patient was educated on clinical findings, diagnosis and treatment plans. Patient understands all that has been explained and all questions were answered to apparent satisfaction.      - Debrided toenails 1-10 in length and height.   - Follow up in 9-12 weeks.         Haim Best DPM

## 2025-10-28 ENCOUNTER — APPOINTMENT (OUTPATIENT)
Dept: PODIATRY | Facility: CLINIC | Age: 75
End: 2025-10-28
Payer: MEDICARE

## 2025-10-29 ENCOUNTER — APPOINTMENT (OUTPATIENT)
Dept: PRIMARY CARE | Facility: CLINIC | Age: 75
End: 2025-10-29
Payer: MEDICARE

## 2026-01-27 ENCOUNTER — APPOINTMENT (OUTPATIENT)
Dept: PRIMARY CARE | Facility: CLINIC | Age: 76
End: 2026-01-27
Payer: MEDICARE

## 2026-02-03 ENCOUNTER — APPOINTMENT (OUTPATIENT)
Dept: AUDIOLOGY | Facility: CLINIC | Age: 76
End: 2026-02-03
Payer: MEDICARE

## 2026-02-03 ENCOUNTER — APPOINTMENT (OUTPATIENT)
Dept: OTOLARYNGOLOGY | Facility: CLINIC | Age: 76
End: 2026-02-03
Payer: MEDICARE

## 2026-04-21 ENCOUNTER — APPOINTMENT (OUTPATIENT)
Dept: PRIMARY CARE | Facility: CLINIC | Age: 76
End: 2026-04-21
Payer: MEDICARE